# Patient Record
Sex: FEMALE | Race: OTHER | Employment: FULL TIME | URBAN - METROPOLITAN AREA
[De-identification: names, ages, dates, MRNs, and addresses within clinical notes are randomized per-mention and may not be internally consistent; named-entity substitution may affect disease eponyms.]

---

## 2017-03-08 ENCOUNTER — HOSPITAL ENCOUNTER (OUTPATIENT)
Age: 37
Discharge: HOME | End: 2017-03-08
Attending: EMERGENCY MEDICINE
Payer: COMMERCIAL

## 2017-03-08 ENCOUNTER — APPOINTMENT (OUTPATIENT)
Dept: GENERAL RADIOLOGY | Age: 37
End: 2017-03-08
Attending: EMERGENCY MEDICINE
Payer: COMMERCIAL

## 2017-03-08 VITALS
DIASTOLIC BLOOD PRESSURE: 83 MMHG | TEMPERATURE: 98 F | HEART RATE: 81 BPM | OXYGEN SATURATION: 99 % | RESPIRATION RATE: 18 BRPM | SYSTOLIC BLOOD PRESSURE: 137 MMHG

## 2017-03-08 DIAGNOSIS — M54.6 ACUTE LEFT-SIDED THORACIC BACK PAIN: Primary | ICD-10-CM

## 2017-03-08 LAB — B-HCG UR QL: NEGATIVE

## 2017-03-08 PROCEDURE — 99214 OFFICE O/P EST MOD 30 MIN: CPT

## 2017-03-08 PROCEDURE — 71020 XR CHEST PA + LAT CHEST (CPT=71020): CPT

## 2017-03-08 PROCEDURE — 93010 ELECTROCARDIOGRAM REPORT: CPT

## 2017-03-08 PROCEDURE — 81025 URINE PREGNANCY TEST: CPT

## 2017-03-08 PROCEDURE — 93010 ELECTROCARDIOGRAM REPORT: CPT | Performed by: EMERGENCY MEDICINE

## 2017-03-08 PROCEDURE — 93005 ELECTROCARDIOGRAM TRACING: CPT

## 2017-03-08 RX ORDER — CYCLOBENZAPRINE HCL 10 MG
10 TABLET ORAL NIGHTLY
Qty: 7 TABLET | Refills: 0 | Status: SHIPPED | OUTPATIENT
Start: 2017-03-08 | End: 2017-03-15

## 2017-03-08 NOTE — ED NOTES
Request pregnancy test. States her breast hurt and even though period less than one month ago- she did a preg test last week and negative- maybe this week she is positive.

## 2017-03-08 NOTE — ED INITIAL ASSESSMENT (HPI)
Sudden onset of left lateral chest discomfort which started yesterday no trauma occasional shortness of breath non smoker - unable to get comfortable not tender to palpations. Not reproduce able. No URI complaints.

## 2017-03-08 NOTE — ED NOTES
Avoid heavy lifting carring motrin for pain flexeril at night. Follow up with pcp in 3 days if not better.  Go to the er for new or worse concerns

## 2017-03-08 NOTE — ED PROVIDER NOTES
Patient Seen in: [facility] In [address] [place] [address]    History   Patient presents with:  Back Pain    Stated Complaint: back pain     HPI    The patient is a 21-year-old female who presents with sharp pain under her left scapula for 2 days.   Pa Cardiovascular: Normal rate, regular rhythm, normal heart sounds and intact distal pulses. No murmur heard. Pulmonary/Chest: Effort normal and breath sounds normal.   Abdominal: Soft. Bowel sounds are normal. There is no tenderness.    Musculoskeletal:

## 2017-10-26 ENCOUNTER — HOSPITAL ENCOUNTER (EMERGENCY)
Facility: HOSPITAL | Age: 37
Discharge: HOME | End: 2017-10-26
Payer: COMMERCIAL

## 2017-10-26 VITALS
HEIGHT: 61 IN | OXYGEN SATURATION: 99 % | BODY MASS INDEX: 33.04 KG/M2 | TEMPERATURE: 98 F | SYSTOLIC BLOOD PRESSURE: 106 MMHG | RESPIRATION RATE: 16 BRPM | WEIGHT: 175 LBS | HEART RATE: 102 BPM | DIASTOLIC BLOOD PRESSURE: 62 MMHG

## 2017-10-26 DIAGNOSIS — K29.00 ACUTE GASTRITIS WITHOUT HEMORRHAGE, UNSPECIFIED GASTRITIS TYPE: Primary | ICD-10-CM

## 2017-10-26 PROCEDURE — 85025 COMPLETE CBC W/AUTO DIFF WBC: CPT | Performed by: NURSE PRACTITIONER

## 2017-10-26 PROCEDURE — 87086 URINE CULTURE/COLONY COUNT: CPT

## 2017-10-26 PROCEDURE — 80076 HEPATIC FUNCTION PANEL: CPT | Performed by: NURSE PRACTITIONER

## 2017-10-26 PROCEDURE — 96361 HYDRATE IV INFUSION ADD-ON: CPT

## 2017-10-26 PROCEDURE — 81025 URINE PREGNANCY TEST: CPT

## 2017-10-26 PROCEDURE — 80048 BASIC METABOLIC PNL TOTAL CA: CPT | Performed by: NURSE PRACTITIONER

## 2017-10-26 PROCEDURE — 81001 URINALYSIS AUTO W/SCOPE: CPT

## 2017-10-26 PROCEDURE — 96374 THER/PROPH/DIAG INJ IV PUSH: CPT

## 2017-10-26 PROCEDURE — 83690 ASSAY OF LIPASE: CPT | Performed by: NURSE PRACTITIONER

## 2017-10-26 PROCEDURE — 99284 EMERGENCY DEPT VISIT MOD MDM: CPT

## 2017-10-26 RX ORDER — DICYCLOMINE HCL 20 MG
20 TABLET ORAL 4 TIMES DAILY PRN
Qty: 30 TABLET | Refills: 0 | Status: SHIPPED | OUTPATIENT
Start: 2017-10-26 | End: 2017-11-25

## 2017-10-26 RX ORDER — ONDANSETRON 2 MG/ML
4 INJECTION INTRAMUSCULAR; INTRAVENOUS ONCE
Status: COMPLETED | OUTPATIENT
Start: 2017-10-26 | End: 2017-10-26

## 2017-10-26 NOTE — ED PROVIDER NOTES
Patient Seen in: [facility] Emergency Department    History   Patient presents with:  Abdomen/Flank Pain (GI/)    Stated Complaint: umbilical pain    HPI  [id number] female, with no past medical history, presents to the emergency department com distension. There is tenderness (uppper mid abdomen diffusely). There is no rebound and no guarding. Musculoskeletal: She exhibits no tenderness. Neurological: She is alert and oriented to person, place, and time. Skin: No rash noted.    Nursing note has resolved/normal labs discussed with the pt  Examination by Dr. Donn Go    Disposition and Plan     Clinical Impression:  Acute gastritis without hemorrhage, unspecified gastritis type  (primary encounter diagnosis)    Disposition:  There is no dispos

## 2022-08-29 ENCOUNTER — OFFICE VISIT (OUTPATIENT)
Dept: FAMILY MEDICINE CLINIC | Facility: CLINIC | Age: 42
End: 2022-08-29
Payer: COMMERCIAL

## 2022-08-29 VITALS
SYSTOLIC BLOOD PRESSURE: 132 MMHG | WEIGHT: 221 LBS | HEART RATE: 78 BPM | HEIGHT: 61 IN | DIASTOLIC BLOOD PRESSURE: 87 MMHG | BODY MASS INDEX: 41.72 KG/M2

## 2022-08-29 DIAGNOSIS — Z13.29 THYROID DISORDER SCREEN: ICD-10-CM

## 2022-08-29 DIAGNOSIS — Z00.00 WELLNESS EXAMINATION: Primary | ICD-10-CM

## 2022-08-29 DIAGNOSIS — L85.3 DRY SKIN: ICD-10-CM

## 2022-08-29 DIAGNOSIS — L85.8 KERATOSIS PILARIS: ICD-10-CM

## 2022-08-29 DIAGNOSIS — Z86.59 HISTORY OF ANXIETY: ICD-10-CM

## 2022-08-29 DIAGNOSIS — Z13.220 LIPID SCREENING: ICD-10-CM

## 2022-08-29 DIAGNOSIS — Z12.31 ENCOUNTER FOR SCREENING MAMMOGRAM FOR MALIGNANT NEOPLASM OF BREAST: ICD-10-CM

## 2022-08-29 DIAGNOSIS — R63.5 WEIGHT GAIN: ICD-10-CM

## 2022-08-29 DIAGNOSIS — L30.9 ECZEMA, UNSPECIFIED TYPE: ICD-10-CM

## 2022-08-29 DIAGNOSIS — Z12.4 CERVICAL CANCER SCREENING: ICD-10-CM

## 2022-08-29 DIAGNOSIS — Z13.0 SCREENING FOR DEFICIENCY ANEMIA: ICD-10-CM

## 2022-08-29 RX ORDER — AMMONIUM LACTATE 12 G/100G
1 CREAM TOPICAL 2 TIMES DAILY
Qty: 385 G | Refills: 1 | Status: SHIPPED | OUTPATIENT
Start: 2022-08-29 | End: 2023-08-24

## 2022-08-29 RX ORDER — BETAMETHASONE DIPROPIONATE 0.5 MG/G
1 CREAM TOPICAL 2 TIMES DAILY
Qty: 15 G | Refills: 0 | Status: SHIPPED | OUTPATIENT
Start: 2022-08-29

## 2022-09-07 ENCOUNTER — LAB ENCOUNTER (OUTPATIENT)
Dept: LAB | Age: 42
End: 2022-09-07
Attending: FAMILY MEDICINE
Payer: COMMERCIAL

## 2022-09-07 DIAGNOSIS — Z13.29 THYROID DISORDER SCREEN: ICD-10-CM

## 2022-09-07 DIAGNOSIS — Z00.00 WELLNESS EXAMINATION: ICD-10-CM

## 2022-09-07 DIAGNOSIS — R79.89 ABNORMAL THYROID BLOOD TEST: Primary | ICD-10-CM

## 2022-09-07 DIAGNOSIS — Z13.220 LIPID SCREENING: ICD-10-CM

## 2022-09-07 DIAGNOSIS — Z13.0 SCREENING FOR DEFICIENCY ANEMIA: ICD-10-CM

## 2022-09-07 LAB
ALBUMIN SERPL-MCNC: 3.4 G/DL (ref 3.4–5)
ALBUMIN/GLOB SERPL: 1 {RATIO} (ref 1–2)
ALP LIVER SERPL-CCNC: 66 U/L
ALT SERPL-CCNC: 32 U/L
ANION GAP SERPL CALC-SCNC: 9 MMOL/L (ref 0–18)
AST SERPL-CCNC: 20 U/L (ref 15–37)
BASOPHILS # BLD AUTO: 0.03 X10(3) UL (ref 0–0.2)
BASOPHILS NFR BLD AUTO: 0.5 %
BILIRUB SERPL-MCNC: 0.7 MG/DL (ref 0.1–2)
BUN BLD-MCNC: 9 MG/DL (ref 7–18)
BUN/CREAT SERPL: 12.3 (ref 10–20)
CALCIUM BLD-MCNC: 8.5 MG/DL (ref 8.5–10.1)
CHLORIDE SERPL-SCNC: 110 MMOL/L (ref 98–112)
CHOLEST SERPL-MCNC: 186 MG/DL (ref ?–200)
CO2 SERPL-SCNC: 22 MMOL/L (ref 21–32)
CREAT BLD-MCNC: 0.73 MG/DL
DEPRECATED RDW RBC AUTO: 45.2 FL (ref 35.1–46.3)
EOSINOPHIL # BLD AUTO: 0.08 X10(3) UL (ref 0–0.7)
EOSINOPHIL NFR BLD AUTO: 1.3 %
ERYTHROCYTE [DISTWIDTH] IN BLOOD BY AUTOMATED COUNT: 12.4 % (ref 11–15)
FASTING PATIENT LIPID ANSWER: YES
FASTING STATUS PATIENT QL REPORTED: YES
GFR SERPLBLD BASED ON 1.73 SQ M-ARVRAT: 106 ML/MIN/1.73M2 (ref 60–?)
GLOBULIN PLAS-MCNC: 3.3 G/DL (ref 2.8–4.4)
GLUCOSE BLD-MCNC: 95 MG/DL (ref 70–99)
HCT VFR BLD AUTO: 42.4 %
HDLC SERPL-MCNC: 45 MG/DL (ref 40–59)
HGB BLD-MCNC: 13.5 G/DL
IMM GRANULOCYTES # BLD AUTO: 0.03 X10(3) UL (ref 0–1)
IMM GRANULOCYTES NFR BLD: 0.5 %
LDLC SERPL CALC-MCNC: 97 MG/DL (ref ?–100)
LYMPHOCYTES # BLD AUTO: 1.52 X10(3) UL (ref 1–4)
LYMPHOCYTES NFR BLD AUTO: 25.5 %
MCH RBC QN AUTO: 30.8 PG (ref 26–34)
MCHC RBC AUTO-ENTMCNC: 31.8 G/DL (ref 31–37)
MCV RBC AUTO: 96.8 FL
MONOCYTES # BLD AUTO: 0.55 X10(3) UL (ref 0.1–1)
MONOCYTES NFR BLD AUTO: 9.2 %
NEUTROPHILS # BLD AUTO: 3.75 X10 (3) UL (ref 1.5–7.7)
NEUTROPHILS # BLD AUTO: 3.75 X10(3) UL (ref 1.5–7.7)
NEUTROPHILS NFR BLD AUTO: 63 %
NONHDLC SERPL-MCNC: 141 MG/DL (ref ?–130)
OSMOLALITY SERPL CALC.SUM OF ELEC: 290 MOSM/KG (ref 275–295)
PLATELET # BLD AUTO: 277 10(3)UL (ref 150–450)
POTASSIUM SERPL-SCNC: 3.8 MMOL/L (ref 3.5–5.1)
PROT SERPL-MCNC: 6.7 G/DL (ref 6.4–8.2)
RBC # BLD AUTO: 4.38 X10(6)UL
SODIUM SERPL-SCNC: 141 MMOL/L (ref 136–145)
T4 FREE SERPL-MCNC: 0.8 NG/DL (ref 0.8–1.7)
TRIGL SERPL-MCNC: 263 MG/DL (ref 30–149)
TSI SER-ACNC: 4.23 MIU/ML (ref 0.36–3.74)
VLDLC SERPL CALC-MCNC: 44 MG/DL (ref 0–30)
WBC # BLD AUTO: 6 X10(3) UL (ref 4–11)

## 2022-09-07 PROCEDURE — 80061 LIPID PANEL: CPT

## 2022-09-07 PROCEDURE — 84443 ASSAY THYROID STIM HORMONE: CPT

## 2022-09-07 PROCEDURE — 85025 COMPLETE CBC W/AUTO DIFF WBC: CPT

## 2022-09-07 PROCEDURE — 36415 COLL VENOUS BLD VENIPUNCTURE: CPT

## 2022-09-07 PROCEDURE — 80053 COMPREHEN METABOLIC PANEL: CPT

## 2022-09-07 PROCEDURE — 84439 ASSAY OF FREE THYROXINE: CPT

## 2022-09-14 LAB — HPV I/H RISK 1 DNA SPEC QL NAA+PROBE: NEGATIVE

## 2022-09-22 ENCOUNTER — HOSPITAL ENCOUNTER (OUTPATIENT)
Age: 42
Discharge: HOME | End: 2022-09-22

## 2022-09-22 ENCOUNTER — APPOINTMENT (OUTPATIENT)
Dept: GENERAL RADIOLOGY | Age: 42
End: 2022-09-22
Attending: NURSE PRACTITIONER

## 2022-09-22 VITALS
OXYGEN SATURATION: 100 % | BODY MASS INDEX: 41.54 KG/M2 | HEART RATE: 110 BPM | HEIGHT: 61 IN | RESPIRATION RATE: 18 BRPM | SYSTOLIC BLOOD PRESSURE: 111 MMHG | DIASTOLIC BLOOD PRESSURE: 69 MMHG | WEIGHT: 220 LBS | TEMPERATURE: 97 F

## 2022-09-22 DIAGNOSIS — R09.89 FEELING OF FOREIGN BODY IN THROAT: Primary | ICD-10-CM

## 2022-09-22 PROCEDURE — 70360 X-RAY EXAM OF NECK: CPT | Performed by: NURSE PRACTITIONER

## 2022-09-22 RX ORDER — LIDOCAINE HYDROCHLORIDE 20 MG/ML
10 SOLUTION OROPHARYNGEAL ONCE
Status: COMPLETED | OUTPATIENT
Start: 2022-09-22 | End: 2022-09-22

## 2022-09-22 NOTE — ED INITIAL ASSESSMENT (HPI)
Pt presents to the IC with c/o throat irritation- was eating bread this morning and feels like bread \"is stuck in throat\". Pt able to eat and drink afterwards.

## 2022-09-28 ENCOUNTER — OFFICE VISIT (OUTPATIENT)
Dept: GASTROENTEROLOGY | Facility: CLINIC | Age: 42
End: 2022-09-28

## 2022-09-28 ENCOUNTER — TELEPHONE (OUTPATIENT)
Dept: GASTROENTEROLOGY | Facility: CLINIC | Age: 42
End: 2022-09-28

## 2022-09-28 VITALS
DIASTOLIC BLOOD PRESSURE: 83 MMHG | HEART RATE: 89 BPM | BODY MASS INDEX: 41.91 KG/M2 | SYSTOLIC BLOOD PRESSURE: 123 MMHG | WEIGHT: 222 LBS | HEIGHT: 61 IN

## 2022-09-28 DIAGNOSIS — R12 CHRONIC HEARTBURN: ICD-10-CM

## 2022-09-28 DIAGNOSIS — R09.89 GLOBUS SENSATION: Primary | ICD-10-CM

## 2022-09-28 DIAGNOSIS — R09.89 GLOBUS SENSATION: ICD-10-CM

## 2022-09-28 DIAGNOSIS — R13.10 DYSPHAGIA, UNSPECIFIED TYPE: Primary | ICD-10-CM

## 2022-09-28 DIAGNOSIS — K21.9 GASTROESOPHAGEAL REFLUX DISEASE, UNSPECIFIED WHETHER ESOPHAGITIS PRESENT: ICD-10-CM

## 2022-09-28 PROCEDURE — 99203 OFFICE O/P NEW LOW 30 MIN: CPT | Performed by: INTERNAL MEDICINE

## 2022-09-28 PROCEDURE — 3079F DIAST BP 80-89 MM HG: CPT | Performed by: INTERNAL MEDICINE

## 2022-09-28 PROCEDURE — 3074F SYST BP LT 130 MM HG: CPT | Performed by: INTERNAL MEDICINE

## 2022-09-28 PROCEDURE — 3008F BODY MASS INDEX DOCD: CPT | Performed by: INTERNAL MEDICINE

## 2022-09-28 NOTE — PATIENT INSTRUCTIONS
1.  Eat softer foods until the symptoms improve.   2.  Take Prilosec once daily in the morning 15-30 minutes before breakfast.  3.  Schedule upper endoscopy with monitored anesthesia care for dysphagia, chronic heartburn and foreign body sensation (globus)

## 2022-09-28 NOTE — TELEPHONE ENCOUNTER
Patient saw Dr. Rocio Rea in office today. Needed to be seen next week. He ok'd to schedule with provider who had an opening. Scheduled for:  [phone]  Provider Name:  Dr. Alice Chaparro  Date:  10/3/2022  [facility]  [facility]  Sedation:  MAC  Time:  10:15 am, (pt is aware that [facility]placed [facility] will call the day before to confirm arrival time)  Prep:  NPO after midnight  Meds/Allergies Reconciled?:  Yes  Diagnosis with codes:  Dysphagia R13.10; Chronic heartburn R12; Globus sensation R09.89  Was patient informed to call insurance with codes (Y/N):  Yes  Referral sent?:  Yes  [facility] or [id number] notified?: Electronic case request was sent to [facility] via [employer]. Medication Orders:  N/A  Misc Orders:  Patient was informed that they will need a COVID 19 test prior to their procedure. Patient verbally understood & will await a phone call from [facility] to schedule. Further instructions given by staff:  I discussed the prep instructions with the patient which she verbally understood and provided patient with prep instruction sheet.

## 2022-09-30 ENCOUNTER — LAB REQUISITION (OUTPATIENT)
Dept: SURGERY | Age: 42
End: 2022-09-30
Payer: COMMERCIAL

## 2022-09-30 DIAGNOSIS — Z01.818 PREOP EXAMINATION: ICD-10-CM

## 2022-10-01 LAB — SARS-COV-2 RNA RESP QL NAA+PROBE: NOT DETECTED

## 2022-10-03 ENCOUNTER — SURGERY CENTER DOCUMENTATION (OUTPATIENT)
Dept: SURGERY | Age: 42
End: 2022-10-03

## 2022-10-03 ENCOUNTER — LAB REQUISITION (OUTPATIENT)
Dept: SURGERY | Age: 42
End: 2022-10-03
Payer: COMMERCIAL

## 2022-10-03 DIAGNOSIS — R09.89 ABNORMAL CHEST SOUNDS: ICD-10-CM

## 2022-10-03 DIAGNOSIS — R19.7 DIARRHEA: ICD-10-CM

## 2022-10-03 DIAGNOSIS — R12 HEARTBURN: ICD-10-CM

## 2022-10-03 PROCEDURE — 88305 TISSUE EXAM BY PATHOLOGIST: CPT | Performed by: INTERNAL MEDICINE

## 2022-10-03 PROCEDURE — 88312 SPECIAL STAINS GROUP 1: CPT | Performed by: INTERNAL MEDICINE

## 2022-10-03 NOTE — PROCEDURES
ESOPHAGOGASTRODUODENOSCOPY (EGD) [address]     1980 Age 40year old   PCP Neris Hurley MD Endoscopist Ciera Bojorquez MD     Date of procedure: 10/03/22    Location: [facility]     Procedure: EGD with cold biopsy    Pre-operative diagnosis: globus, GERD, diarrhea    Post-operative diagnosis: LA Class A esophagitis, mild gastritis, gastric polyps, 1 cm hiatal hernia    Medications: MAC    Complications: none    Procedure:  Informed consent was obtained from the patient after the risks of the procedure were discussed, including but not limited to bleeding, perforation, aspiration, infection, or possibility of a missed lesion. After discussions of the risks/benefits and alternatives to this procedure, as well as the planned sedation, the patient was placed in the left lateral decubitus position and begun on continuous blood pressure pulse oximetry and EKG monitoring and this was maintained throughout the procedure. Once an adequate level of sedation was obtained a bite block was placed. Then the lubricated tip of the [id number] diagnostic video upper endoscope was inserted and advanced using direct visualization into the posterior pharynx and ultimately into the esophagus. Complications: None    Findings:      1. Esophagus: The squamocolumnar (SCJ) junction was noted at 35 cm and appeared mildly irregular due to LA Class A distal esophagitis that was sampled with cold forceps biopsies. There was a non-obstructing Schatzki's ring at the SCJ that was disrupted in four quadrants with the cold forceps biopsies. The diaphragmatic pinch was noted noted at 36 cm from the incisors. There was a 1 cm hiatal hernai. The esophageal mucosa appeared otherwise unremarkable. Proximal esophageal biopsies were taken and placed in a separate jar. 2. Stomach: The stomach distended normally. Normal rugal folds were seen. The pylorus was patent.  The gastric mucosa appeared mildly erythematous diffusely. Retroflexion revealed a normal fundus and a non-patulous cardia. There were several small gastric polyps measuring less than 5 mm that were sampled with cold forceps biopsies. Random cold forceps biopsies were taken of the antrum, incisura, and body. 3. Duodenum: The duodenal mucosa appeared normal in the 1st and 2nd portion of the duodenum. Cold forceps biopsies were taken of the bulb and descending duodenum. Impression:  1. LA Class A distal esophagitis. Biopsied. 2. Non-obstructing Schatzki's ring at the SCJ. Disrupted in four quadrants with cold forceps biopsies. 3. Otherwise unremarkable esophagus. Random proximal esophageal biopsies were taken. 4. Mild gastritis. Biopsied. 5. Small gastric polyps. Likely fundic gland polyps. Biopsied. 6. Normal duodenum. Biopsied. Recommend:  1. Await pathology. 2. Follow up with Dr. Reza Malone in 2 weeks. 3. Take omeprazole (Prilosec) 40 mg daily.      >>>If tissue was sampled/removed and you have not received your pathology results either by phone or letter within 2 weeks, please call our office at [phone].     Specimens: esophagus, stomach, duodenum    Blood loss: <1 ml

## 2022-10-17 ENCOUNTER — OFFICE VISIT (OUTPATIENT)
Dept: FAMILY MEDICINE CLINIC | Facility: CLINIC | Age: 42
End: 2022-10-17
Payer: COMMERCIAL

## 2022-10-17 VITALS
DIASTOLIC BLOOD PRESSURE: 82 MMHG | SYSTOLIC BLOOD PRESSURE: 121 MMHG | HEART RATE: 84 BPM | WEIGHT: 218 LBS | BODY MASS INDEX: 41.16 KG/M2 | HEIGHT: 61 IN

## 2022-10-17 DIAGNOSIS — R10.2 PELVIC PRESSURE IN FEMALE: ICD-10-CM

## 2022-10-17 DIAGNOSIS — Z23 NEED FOR TDAP VACCINATION: ICD-10-CM

## 2022-10-17 DIAGNOSIS — H81.13 BENIGN PAROXYSMAL POSITIONAL VERTIGO DUE TO BILATERAL VESTIBULAR DISORDER: Primary | ICD-10-CM

## 2022-10-17 PROCEDURE — 3079F DIAST BP 80-89 MM HG: CPT | Performed by: FAMILY MEDICINE

## 2022-10-17 PROCEDURE — 99213 OFFICE O/P EST LOW 20 MIN: CPT | Performed by: FAMILY MEDICINE

## 2022-10-17 PROCEDURE — 3074F SYST BP LT 130 MM HG: CPT | Performed by: FAMILY MEDICINE

## 2022-10-17 PROCEDURE — 90471 IMMUNIZATION ADMIN: CPT | Performed by: FAMILY MEDICINE

## 2022-10-17 PROCEDURE — 3008F BODY MASS INDEX DOCD: CPT | Performed by: FAMILY MEDICINE

## 2022-10-17 PROCEDURE — 90715 TDAP VACCINE 7 YRS/> IM: CPT | Performed by: FAMILY MEDICINE

## 2023-02-18 ENCOUNTER — LAB ENCOUNTER (OUTPATIENT)
Dept: LAB | Age: 43
End: 2023-02-18
Attending: FAMILY MEDICINE
Payer: COMMERCIAL

## 2023-02-18 DIAGNOSIS — R79.89 ABNORMAL THYROID BLOOD TEST: ICD-10-CM

## 2023-02-18 LAB
THYROPEROXIDASE AB SERPL-ACNC: 3384 U/ML (ref ?–60)
TSI SER-ACNC: 1.83 MIU/ML (ref 0.36–3.74)

## 2023-02-18 PROCEDURE — 86376 MICROSOMAL ANTIBODY EACH: CPT

## 2023-02-18 PROCEDURE — 84443 ASSAY THYROID STIM HORMONE: CPT

## 2023-02-18 PROCEDURE — 36415 COLL VENOUS BLD VENIPUNCTURE: CPT

## 2023-02-20 PROBLEM — R79.89 ABNORMAL THYROID BLOOD TEST: Status: ACTIVE | Noted: 2023-02-20

## 2023-03-27 ENCOUNTER — OFFICE VISIT (OUTPATIENT)
Dept: FAMILY MEDICINE CLINIC | Facility: CLINIC | Age: 43
End: 2023-03-27

## 2023-03-27 VITALS
DIASTOLIC BLOOD PRESSURE: 84 MMHG | SYSTOLIC BLOOD PRESSURE: 136 MMHG | BODY MASS INDEX: 41.91 KG/M2 | HEART RATE: 89 BPM | HEIGHT: 61 IN | WEIGHT: 222 LBS

## 2023-03-27 DIAGNOSIS — R42 EPISODIC LIGHTHEADEDNESS: Primary | ICD-10-CM

## 2023-03-27 DIAGNOSIS — G44.219 EPISODIC TENSION-TYPE HEADACHE, NOT INTRACTABLE: ICD-10-CM

## 2023-03-27 PROCEDURE — 3079F DIAST BP 80-89 MM HG: CPT | Performed by: FAMILY MEDICINE

## 2023-03-27 PROCEDURE — 3075F SYST BP GE 130 - 139MM HG: CPT | Performed by: FAMILY MEDICINE

## 2023-03-27 PROCEDURE — 99214 OFFICE O/P EST MOD 30 MIN: CPT | Performed by: FAMILY MEDICINE

## 2023-03-27 PROCEDURE — 3008F BODY MASS INDEX DOCD: CPT | Performed by: FAMILY MEDICINE

## 2023-03-27 RX ORDER — NORTRIPTYLINE HYDROCHLORIDE 10 MG/1
10 CAPSULE ORAL NIGHTLY
Qty: 30 CAPSULE | Refills: 1 | Status: SHIPPED | OUTPATIENT
Start: 2023-03-27

## 2023-04-11 ENCOUNTER — LAB ENCOUNTER (OUTPATIENT)
Dept: LAB | Age: 43
End: 2023-04-11
Attending: FAMILY MEDICINE
Payer: COMMERCIAL

## 2023-04-11 ENCOUNTER — EKG ENCOUNTER (OUTPATIENT)
Dept: LAB | Age: 43
End: 2023-04-11
Attending: FAMILY MEDICINE
Payer: COMMERCIAL

## 2023-04-11 DIAGNOSIS — R42 EPISODIC LIGHTHEADEDNESS: Primary | ICD-10-CM

## 2023-04-11 DIAGNOSIS — R42 EPISODIC LIGHTHEADEDNESS: ICD-10-CM

## 2023-04-11 DIAGNOSIS — R94.31 ABNORMAL EKG: ICD-10-CM

## 2023-04-11 LAB
ATRIAL RATE: 80 BPM
BASOPHILS # BLD AUTO: 0.02 X10(3) UL (ref 0–0.2)
BASOPHILS NFR BLD AUTO: 0.3 %
CHOLEST SERPL-MCNC: 199 MG/DL (ref ?–200)
DEPRECATED RDW RBC AUTO: 46 FL (ref 35.1–46.3)
EOSINOPHIL # BLD AUTO: 0.09 X10(3) UL (ref 0–0.7)
EOSINOPHIL NFR BLD AUTO: 1.4 %
ERYTHROCYTE [DISTWIDTH] IN BLOOD BY AUTOMATED COUNT: 12.9 % (ref 11–15)
EST. AVERAGE GLUCOSE BLD GHB EST-MCNC: 108 MG/DL (ref 68–126)
FASTING PATIENT LIPID ANSWER: YES
HBA1C MFR BLD: 5.4 % (ref ?–5.7)
HCT VFR BLD AUTO: 41.4 %
HDLC SERPL-MCNC: 53 MG/DL (ref 40–59)
HGB BLD-MCNC: 13.4 G/DL
IMM GRANULOCYTES # BLD AUTO: 0.02 X10(3) UL (ref 0–1)
IMM GRANULOCYTES NFR BLD: 0.3 %
LDLC SERPL CALC-MCNC: 115 MG/DL (ref ?–100)
LYMPHOCYTES # BLD AUTO: 1.72 X10(3) UL (ref 1–4)
LYMPHOCYTES NFR BLD AUTO: 26.9 %
MCH RBC QN AUTO: 30.9 PG (ref 26–34)
MCHC RBC AUTO-ENTMCNC: 32.4 G/DL (ref 31–37)
MCV RBC AUTO: 95.6 FL
MONOCYTES # BLD AUTO: 0.49 X10(3) UL (ref 0.1–1)
MONOCYTES NFR BLD AUTO: 7.7 %
NEUTROPHILS # BLD AUTO: 4.06 X10 (3) UL (ref 1.5–7.7)
NEUTROPHILS # BLD AUTO: 4.06 X10(3) UL (ref 1.5–7.7)
NEUTROPHILS NFR BLD AUTO: 63.4 %
NONHDLC SERPL-MCNC: 146 MG/DL (ref ?–130)
P AXIS: 36 DEGREES
P-R INTERVAL: 138 MS
PLATELET # BLD AUTO: 300 10(3)UL (ref 150–450)
Q-T INTERVAL: 364 MS
QRS DURATION: 66 MS
QTC CALCULATION (BEZET): 419 MS
R AXIS: 19 DEGREES
RBC # BLD AUTO: 4.33 X10(6)UL
T AXIS: 15 DEGREES
TRIGL SERPL-MCNC: 175 MG/DL (ref 30–149)
VENTRICULAR RATE: 80 BPM
VLDLC SERPL CALC-MCNC: 30 MG/DL (ref 0–30)
WBC # BLD AUTO: 6.4 X10(3) UL (ref 4–11)

## 2023-04-11 PROCEDURE — 93005 ELECTROCARDIOGRAM TRACING: CPT

## 2023-04-11 PROCEDURE — 85025 COMPLETE CBC W/AUTO DIFF WBC: CPT

## 2023-04-11 PROCEDURE — 83036 HEMOGLOBIN GLYCOSYLATED A1C: CPT

## 2023-04-11 PROCEDURE — 93010 ELECTROCARDIOGRAM REPORT: CPT | Performed by: INTERNAL MEDICINE

## 2023-04-11 PROCEDURE — 36415 COLL VENOUS BLD VENIPUNCTURE: CPT

## 2023-04-11 PROCEDURE — 80061 LIPID PANEL: CPT

## 2023-04-19 DIAGNOSIS — G44.219 EPISODIC TENSION-TYPE HEADACHE, NOT INTRACTABLE: ICD-10-CM

## 2023-04-20 RX ORDER — NORTRIPTYLINE HYDROCHLORIDE 10 MG/1
10 CAPSULE ORAL NIGHTLY
Qty: 30 CAPSULE | Refills: 1 | Status: SHIPPED | OUTPATIENT
Start: 2023-04-20

## 2023-05-09 DIAGNOSIS — G44.219 EPISODIC TENSION-TYPE HEADACHE, NOT INTRACTABLE: ICD-10-CM

## 2023-05-09 NOTE — TELEPHONE ENCOUNTER
nortriptyline 10 MG Oral Cap, Take 1 capsule (10 mg total) by mouth nightly., Disp: 30 capsule, Rfl: 1

## 2023-05-10 ENCOUNTER — PATIENT MESSAGE (OUTPATIENT)
Dept: FAMILY MEDICINE CLINIC | Facility: CLINIC | Age: 43
End: 2023-05-10

## 2023-05-10 RX ORDER — NORTRIPTYLINE HYDROCHLORIDE 10 MG/1
10 CAPSULE ORAL NIGHTLY
Qty: 30 CAPSULE | Refills: 1 | Status: SHIPPED | OUTPATIENT
Start: 2023-05-10

## 2023-05-10 NOTE — TELEPHONE ENCOUNTER
Please review; protocol failed. Or has no protocol    Requested Prescriptions   Pending Prescriptions Disp Refills    nortriptyline 10 MG Oral Cap 30 capsule 1     Sig: Take 1 capsule (10 mg total) by mouth nightly.        There is no refill protocol information for this order           Recent Outpatient Visits              1 month ago Episodic lightheadedness    [facility], [address], [place] Ar[place] Economy, [place]    Office Visit    6 months ago Benign paroxysmal positional vertigo due to bilateral vestibular disorder    [facility], [address], [place] Ar[place] Economy, MD    Office Visit    7 months ago Globus sensation    Jessica Aragon MD    Office Visit    8 months ago Wellness examination    [facility], [address], [place] Economy, [place]    Office Visit

## 2023-05-10 NOTE — TELEPHONE ENCOUNTER
From: Reshma Saab  To: Yazmin Magana. Teo Shea MD  Sent: 5/10/2023 12:54 PM CDT  Subject: Regarding medication refill    I have not been in need to take the last medication prescribed to me. No need to resend refills, please.

## 2023-08-03 ENCOUNTER — HOSPITAL ENCOUNTER (OUTPATIENT)
Dept: MAMMOGRAPHY | Facility: HOSPITAL | Age: 43
Discharge: HOME | End: 2023-08-03
Attending: FAMILY MEDICINE
Payer: COMMERCIAL

## 2023-08-03 DIAGNOSIS — Z12.31 ENCOUNTER FOR SCREENING MAMMOGRAM FOR MALIGNANT NEOPLASM OF BREAST: ICD-10-CM

## 2023-08-03 PROCEDURE — 77063 BREAST TOMOSYNTHESIS BI: CPT | Performed by: FAMILY MEDICINE

## 2023-08-03 PROCEDURE — 77067 SCR MAMMO BI INCL CAD: CPT | Performed by: FAMILY MEDICINE

## 2023-11-20 ENCOUNTER — HOSPITAL ENCOUNTER (EMERGENCY)
Facility: HOSPITAL | Age: 43
Discharge: HOME | End: 2023-11-20
Attending: EMERGENCY MEDICINE
Payer: COMMERCIAL

## 2023-11-20 ENCOUNTER — APPOINTMENT (OUTPATIENT)
Dept: GENERAL RADIOLOGY | Facility: HOSPITAL | Age: 43
End: 2023-11-20
Payer: COMMERCIAL

## 2023-11-20 VITALS
OXYGEN SATURATION: 97 % | TEMPERATURE: 98 F | HEART RATE: 90 BPM | RESPIRATION RATE: 18 BRPM | SYSTOLIC BLOOD PRESSURE: 128 MMHG | DIASTOLIC BLOOD PRESSURE: 70 MMHG

## 2023-11-20 DIAGNOSIS — S62.609A CLOSED NONDISPLACED FRACTURE OF PHALANX OF FINGER, UNSPECIFIED FINGER, UNSPECIFIED PHALANX, INITIAL ENCOUNTER: Primary | ICD-10-CM

## 2023-11-20 PROCEDURE — 73140 X-RAY EXAM OF FINGER(S): CPT

## 2023-11-20 PROCEDURE — 26750 TREAT FINGER FRACTURE EACH: CPT

## 2023-11-20 PROCEDURE — 99283 EMERGENCY DEPT VISIT LOW MDM: CPT

## 2023-11-21 NOTE — DISCHARGE INSTRUCTIONS
Return to ER for changes in color of skin, decreased pulses in that extremity, hard compartments. If you have an open wound associated with your fracture, signs of infection are redness associated with warmth/pain, puss from site, fevers of 100.4F or above. Please elevate your your fracture above your heart when at rest to reduce swelling. Please follow with the hand specialist immediately. There may be an element of a tendon laceration that you will need mending      The Emergency Department is not intended to be a substitute for an effort to provide complete medical care. The imaging, if any, have often been interpreted on a preliminary basis pending final reading by the radiologist. If your blood pressure was greater than 140/90, please have this blood pressure rechecked by your primary care provider in the next several days.

## 2023-11-21 NOTE — ED INITIAL ASSESSMENT (HPI)
Pt to ED for right ring finger injury after stubbing it on a ball.  Finger is deformed at first knuckle, bruised, ice applied

## 2023-12-07 ENCOUNTER — HOSPITAL ENCOUNTER (OUTPATIENT)
Age: 43
Discharge: HOME | End: 2023-12-07
Payer: COMMERCIAL

## 2023-12-07 VITALS
SYSTOLIC BLOOD PRESSURE: 125 MMHG | OXYGEN SATURATION: 99 % | RESPIRATION RATE: 20 BRPM | HEIGHT: 60 IN | WEIGHT: 190 LBS | TEMPERATURE: 97 F | DIASTOLIC BLOOD PRESSURE: 83 MMHG | BODY MASS INDEX: 37.3 KG/M2 | HEART RATE: 96 BPM

## 2023-12-07 DIAGNOSIS — H60.501 ACUTE OTITIS EXTERNA OF RIGHT EAR, UNSPECIFIED TYPE: Primary | ICD-10-CM

## 2023-12-07 RX ORDER — NEOMYCIN SULFATE, POLYMYXIN B SULFATE AND HYDROCORTISONE 10; 3.5; 1 MG/ML; MG/ML; [USP'U]/ML
3 SUSPENSION/ DROPS AURICULAR (OTIC) 4 TIMES DAILY
Qty: 10 ML | Refills: 0 | Status: SHIPPED | OUTPATIENT
Start: 2023-12-07

## 2024-02-21 ENCOUNTER — OFFICE VISIT (OUTPATIENT)
Dept: FAMILY MEDICINE CLINIC | Facility: CLINIC | Age: 44
End: 2024-02-21
Payer: COMMERCIAL

## 2024-02-21 VITALS
RESPIRATION RATE: 17 BRPM | WEIGHT: 220 LBS | SYSTOLIC BLOOD PRESSURE: 131 MMHG | HEART RATE: 94 BPM | DIASTOLIC BLOOD PRESSURE: 85 MMHG | BODY MASS INDEX: 43.19 KG/M2 | HEIGHT: 60 IN

## 2024-02-21 DIAGNOSIS — N39.46 MIXED STRESS AND URGE URINARY INCONTINENCE: ICD-10-CM

## 2024-02-21 DIAGNOSIS — R10.2 PELVIC PAIN: Primary | ICD-10-CM

## 2024-02-21 DIAGNOSIS — J01.10 SUBACUTE FRONTAL SINUSITIS: ICD-10-CM

## 2024-02-21 DIAGNOSIS — N64.4 MASTALGIA: ICD-10-CM

## 2024-02-21 LAB
CONTROL LINE PRESENT WITH A CLEAR BACKGROUND (YES/NO): YES YES/NO
KIT LOT #: NORMAL NUMERIC
PREGNANCY TEST, URINE: NEGATIVE

## 2024-02-21 PROCEDURE — 3008F BODY MASS INDEX DOCD: CPT | Performed by: FAMILY MEDICINE

## 2024-02-21 PROCEDURE — 3075F SYST BP GE 130 - 139MM HG: CPT | Performed by: FAMILY MEDICINE

## 2024-02-21 PROCEDURE — 3079F DIAST BP 80-89 MM HG: CPT | Performed by: FAMILY MEDICINE

## 2024-02-21 PROCEDURE — 81025 URINE PREGNANCY TEST: CPT | Performed by: FAMILY MEDICINE

## 2024-02-21 PROCEDURE — 99214 OFFICE O/P EST MOD 30 MIN: CPT | Performed by: FAMILY MEDICINE

## 2024-02-21 RX ORDER — FLUTICASONE PROPIONATE 50 MCG
2 SPRAY, SUSPENSION (ML) NASAL DAILY
Qty: 16 G | Refills: 0 | Status: SHIPPED | OUTPATIENT
Start: 2024-02-21 | End: 2024-03-02

## 2024-02-21 RX ORDER — ECHINACEA PURPUREA EXTRACT 125 MG
1 TABLET ORAL AS NEEDED
Qty: 60 ML | Refills: 0 | Status: SHIPPED | OUTPATIENT
Start: 2024-02-21

## 2024-02-21 RX ORDER — AMOXICILLIN 875 MG/1
875 TABLET, COATED ORAL 2 TIMES DAILY
Qty: 20 TABLET | Refills: 0 | Status: SHIPPED | OUTPATIENT
Start: 2024-02-21 | End: 2024-03-02

## 2024-02-21 NOTE — PROGRESS NOTES
Rosibel Mcguire is a 44 year old female.  HPI:   Patient reports that over the last few months she has been experiencing pelvic pain, symptoms appear to be worse during ovulation and just before menses and after manses, she also reports that when she is having bowel movements she has significant pelvic pain but this is also present only during menstruation.  Recently she also has been noticing urinary incontinence that is present with Valsalva maneuvers but also when her bladder is full.  Patient also reports mastalgia, no nipple discharge    Over the last 3 months she has been experiencing postnasal drip, nasal congestion, sinus pressure and headaches  Current Outpatient Medications   Medication Sig Dispense Refill    amoxicillin 875 MG Oral Tab Take 1 tablet (875 mg total) by mouth 2 (two) times daily for 10 days. 20 tablet 0    fluticasone propionate 50 MCG/ACT Nasal Suspension 2 sprays by Each Nare route daily for 10 days. 16 g 0    sodium chloride 0.65 % Nasal Solution 1 spray by Nasal route as needed. 60 mL 0    neomycin-polymyxin-hydrocortisone 3.5-01436-7 Otic Suspension Place 3 drops into the right ear 4 (four) times daily. 10 mL 0      No past medical history on file.   Social History:  Social History     Socioeconomic History    Marital status:    Tobacco Use    Smoking status: Never    Smokeless tobacco: Never   Vaping Use    Vaping Use: Never used   Substance and Sexual Activity    Alcohol use: Not Currently     Comment: social     Drug use: Never    Sexual activity: Yes     Partners: Male          EXAM:   /85   Pulse 94   Resp 17   Ht 5' (1.524 m)   Wt 220 lb (99.8 kg)   LMP 02/10/2024 (Exact Date)   BMI 42.97 kg/m²     Physical Exam  Vitals and nursing note reviewed.   Constitutional:       General: She is not in acute distress.  HENT:      Right Ear: Tympanic membrane, ear canal and external ear normal.      Left Ear: Tympanic membrane, ear canal and external ear normal.       Nose: Mucosal edema present.      Right Turbinates: Enlarged and swollen.      Right Sinus: Frontal sinus tenderness present.      Left Sinus: Frontal sinus tenderness present.   Cardiovascular:      Rate and Rhythm: Normal rate and regular rhythm.   Pulmonary:      Effort: Pulmonary effort is normal.   Abdominal:      General: Abdomen is flat. Bowel sounds are normal. There is no distension.      Tenderness: There is abdominal tenderness in the suprapubic area.   Neurological:      Mental Status: She is alert and oriented to person, place, and time.   Psychiatric:         Mood and Affect: Mood normal.         Behavior: Behavior normal.            ASSESSMENT AND PLAN:   1. Pelvic pain  Pregnancy test completed in clinic unremarkable, she has been presenting with pelvic pain that is worse during menses, not present at this time, noted also urinary incontinence, possible pelvic floor dysfunction, will obtain imaging, referral to urology provided, testing as noted  - US PELVIS (TRANSABDOMINAL AND TRANSVAGINAL) (CPT=76856/84046); Future  - POC Urine pregnancy test [78080]    2. Mastalgia    - Prolactin [E]; Future  - TSH W Reflex To Free T4 [E]; Future    3. Subacute frontal sinusitis    - amoxicillin 875 MG Oral Tab; Take 1 tablet (875 mg total) by mouth 2 (two) times daily for 10 days.  Dispense: 20 tablet; Refill: 0  - fluticasone propionate 50 MCG/ACT Nasal Suspension; 2 sprays by Each Nare route daily for 10 days.  Dispense: 16 g; Refill: 0  - sodium chloride 0.65 % Nasal Solution; 1 spray by Nasal route as needed.  Dispense: 60 mL; Refill: 0    4. Mixed stress and urge urinary incontinence    - Urology Referral - In Network  - Urinalysis with Culture Reflex; Future       The patient indicates understanding of these issues and agrees to the plan.      The above note was creating using Dragon speech recognition technology. Please excuse any typos.

## 2024-02-24 ENCOUNTER — LAB ENCOUNTER (OUTPATIENT)
Dept: LAB | Age: 44
End: 2024-02-24
Attending: FAMILY MEDICINE
Payer: COMMERCIAL

## 2024-02-24 DIAGNOSIS — N64.4 MASTALGIA: ICD-10-CM

## 2024-02-24 DIAGNOSIS — N39.46 MIXED STRESS AND URGE URINARY INCONTINENCE: ICD-10-CM

## 2024-02-24 LAB
BILIRUB UR QL: NEGATIVE
GLUCOSE UR-MCNC: NORMAL MG/DL
HGB UR QL STRIP.AUTO: NEGATIVE
KETONES UR-MCNC: NEGATIVE MG/DL
LEUKOCYTE ESTERASE UR QL STRIP.AUTO: 250
NITRITE UR QL STRIP.AUTO: NEGATIVE
PH UR: 6.5 [PH] (ref 5–8)
PROLACTIN SERPL-MCNC: 9.8 NG/ML
PROT UR-MCNC: NEGATIVE MG/DL
SP GR UR STRIP: 1.02 (ref 1–1.03)
TSI SER-ACNC: 4.72 MIU/ML (ref 0.55–4.78)
UROBILINOGEN UR STRIP-ACNC: NORMAL

## 2024-02-24 PROCEDURE — 87086 URINE CULTURE/COLONY COUNT: CPT

## 2024-02-24 PROCEDURE — 81001 URINALYSIS AUTO W/SCOPE: CPT

## 2024-02-24 PROCEDURE — 84443 ASSAY THYROID STIM HORMONE: CPT

## 2024-02-24 PROCEDURE — 84146 ASSAY OF PROLACTIN: CPT

## 2024-02-24 PROCEDURE — 36415 COLL VENOUS BLD VENIPUNCTURE: CPT

## 2024-03-18 ENCOUNTER — TELEPHONE (OUTPATIENT)
Dept: FAMILY MEDICINE CLINIC | Facility: CLINIC | Age: 44
End: 2024-03-18

## 2024-03-18 NOTE — TELEPHONE ENCOUNTER
Pt is requesting a 90 day refill on   Current Outpatient Medications:     sodium chloride 0.65 % Nasal Solution, 1 spray by Nasal route as needed., Disp: 60 mL, Rfl: 0

## 2024-04-16 ENCOUNTER — HOSPITAL ENCOUNTER (OUTPATIENT)
Dept: ULTRASOUND IMAGING | Age: 44
Discharge: HOME | End: 2024-04-16
Attending: FAMILY MEDICINE
Payer: COMMERCIAL

## 2024-04-16 DIAGNOSIS — R10.2 PELVIC PAIN: ICD-10-CM

## 2024-04-16 PROCEDURE — 76856 US EXAM PELVIC COMPLETE: CPT | Performed by: FAMILY MEDICINE

## 2024-04-16 PROCEDURE — 76830 TRANSVAGINAL US NON-OB: CPT | Performed by: FAMILY MEDICINE

## 2024-05-23 NOTE — ED NOTES
HISTORY OF PRESENT ILLNESS:      The patient is a 61 year old female with history of rheumatoid arthritis for several years now with cervicalgia with mainly pain over the left trapezius and occasional radiation to bilateral upper extremities, no numbness, nothing sensation, no sphincter compromises, Valsalva maneuvers do not elicit her pain.  Her visual analog pain score is up to 8/10.  She underwent physical therapy without improvement of her pain.  She is taking Orentzia and Plaquenil for her rheumatoid arthritis.        Imaging:  Last imaging reviewed.   MRI CERVICAL SPINE WO CONTRAST    (Results Pending)         Recent Imaging:  CT ABDOMEN PELVIS W CONTRAST - Oral and IV contrast  Narrative: EXAM: CT ABDOMEN PELVIS W CONTRAST     CLINICAL INDICATION: pain, vomiting     COMPARISON: CT abdomen pelvis with contrast 6/22/2022     TECHNIQUE: Helical CT was performed of the abdomen and pelvis, with axial,  coronal, and sagittal reconstructions performed and provided for review.  Omnipaque 350 was administered intravenously during the study. Automated  exposure control was utilized.     FINDINGS:     LOWER CHEST: The heart is normal in size. The lung bases are overall clear.     LIVER: Diffusely decreased hepatic attenuation compatible with hepatic  steatosis.     BILIARY TREE AND GALLBLADDER: Gallbladder has been removed. No biliary  dilatation.     PANCREAS: Normal.     SPLEEN: Normal.     ADRENAL GLANDS: Normal.     KIDNEYS AND URETERS: Normal.     BLADDER: Normal.     REPRODUCTIVE ORGANS: Normal uterus. No adnexal mass.     BOWEL: No bowel obstruction or acute inflammation. Appendix has been  removed.     PERITONEUM AND RETROPERITONEUM: No free air or fluid.     VESSELS: Normal caliber abdominal aorta with minimal atherosclerotic  calcification.     LYMPH NODES: No lymphadenopathy.     BODY WALL: Scattered foci of subcutaneous gas in the left buttock without  surrounding induration. Stable 4 mm nodule in the  Pt c/o intermittent upper abdominal pain that started last night. Describes at sharp, 8/10 at worst.  + nausea. Denies diarrhea or vomiting. outer central right  breast.     BONES: No acute or concerning findings.  Impression: Scattered foci of subcutaneous gas in the left buttock without surrounding  induration may be secondary to recent procedure or injection.     Hepatic steatosis.     Electronically Signed by: CHARITO ARCOS M.D.   Signed on: 11/7/2023 5:20 PM   Workstation ID: [id number]        Past Medical History:   History - past medical        Past Medical History:   Diagnosis Date    Chronic neck and back pain      Empty sella syndrome (CMD)      Fibromyalgia      JOLANTA (generalized anxiety disorder) 07/08/2019    GERD (gastroesophageal reflux disease)      HTN (hypertension)      ANGÉLICA (iron deficiency anemia)      Kidney stones      Osteoporosis      Postmenopausal bleeding 07/31/2019    Primary osteoarthritis of right knee 08/19/2016    Rheumatoid arthritis (CMD) 01/10/2013     Overview:  Auto-replaced for ICD-10 Conversion, run by Eliud Monson 10/1/2015 8:15p    Vitamin D deficiency              Past Surgical History:   History - past surgical         Past Surgical History:   Procedure Laterality Date    Appendectomy        Cholecystectomy        Total knee replacement                Medications:  Medications          Current Outpatient Medications   Medication Sig Dispense Refill    cyclobenzaprine (FLEXERIL) 10 MG tablet Take 1 tablet by mouth 3 times daily as needed for Muscle spasms. 90 tablet 0    ondansetron (ZOFRAN ODT) 4 MG disintegrating tablet Place 1 tablet onto the tongue every 8 hours as needed for Nausea. 12 tablet 0    dicyclomine (BENTYL) 20 MG tablet Take 1 tablet by mouth 4 times daily (before meals and nightly) for 7 days. As needed for abdominal cramping/discomfort. 28 tablet 0    abatacept (ORENCIA) 250 MG injection Inject 750 mg into the vein. Do not start before October 28, 2023.        Loratadine 10 MG Cap Take 10 mg by mouth. Do not start before October 28, 2023.        Acetaminophen 325 MG Cap Take 650 mg by mouth.  Do not start before October 28, 2023.        lisinopril (ZESTRIL) 10 MG tablet Take 1 tablet by mouth daily. 90 tablet 3    hydroxychloroquine (PLAQUENIL) 200 MG tablet TOME PIPE TABLETA TODOS LOS D AS        citalopram (CeleXA) 10 MG tablet Take 10 mg by mouth daily.        Vitamin D, Ergocalciferol, 1.25 mg (50,000 units) capsule TAKE 1 CAPSULE BY MOUTH 1 DAY A WEEK. 12 capsule 1    Ferrous Sulfate (IRON PO)          EPINEPHrine 0.3 MG/0.3ML auto-injector Inject 0.3 mLs into the muscle 1 time as needed for Anaphylaxis. 2 each 0    neomycin-polymyxin-hydroCORTisone (CORTISPORIN) 3.5-35530-1 otic solution Place 3 drops into left ear 4 times daily. 10 mL 0    fluticasone (FLONASE) 50 MCG/ACT nasal spray SPRAY 2 SPRAYS INTO EACH NOSTRIL EVERY DAY 16 mL 3      No current facility-administered medications for this encounter.         Current medications          Current Outpatient Medications   Medication Sig Dispense Refill    cyclobenzaprine (FLEXERIL) 10 MG tablet Take 1 tablet by mouth 3 times daily as needed for Muscle spasms. 90 tablet 0    ondansetron (ZOFRAN ODT) 4 MG disintegrating tablet Place 1 tablet onto the tongue every 8 hours as needed for Nausea. 12 tablet 0    dicyclomine (BENTYL) 20 MG tablet Take 1 tablet by mouth 4 times daily (before meals and nightly) for 7 days. As needed for abdominal cramping/discomfort. 28 tablet 0    abatacept (ORENCIA) 250 MG injection Inject 750 mg into the vein. Do not start before October 28, 2023.        Loratadine 10 MG Cap Take 10 mg by mouth. Do not start before October 28, 2023.        Acetaminophen 325 MG Cap Take 650 mg by mouth. Do not start before October 28, 2023.        lisinopril (ZESTRIL) 10 MG tablet Take 1 tablet by mouth daily. 90 tablet 3    hydroxychloroquine (PLAQUENIL) 200 MG tablet TOME PIPE TABLETA TODOS LOS D AS        citalopram (CeleXA) 10 MG tablet Take 10 mg by mouth daily.        Vitamin D, Ergocalciferol, 1.25 mg (50,000 units) capsule TAKE 1  CAPSULE BY MOUTH 1 DAY A WEEK. 12 capsule 1    Ferrous Sulfate (IRON PO)          EPINEPHrine 0.3 MG/0.3ML auto-injector Inject 0.3 mLs into the muscle 1 time as needed for Anaphylaxis. 2 each 0    neomycin-polymyxin-hydroCORTisone (CORTISPORIN) 3.5-04374-4 otic solution Place 3 drops into left ear 4 times daily. 10 mL 0    fluticasone (FLONASE) 50 MCG/ACT nasal spray SPRAY 2 SPRAYS INTO EACH NOSTRIL EVERY DAY 16 mL 3      No current facility-administered medications for this encounter.            Allergies:  ALLERGIES:  Amoxicillin-pot clavulanate, Bactrim ds, Celecoxib, Infliximab, and Morphine     Social History:   Social History            Tobacco Use    Smoking status: Never       Passive exposure: Never    Smokeless tobacco: Never   Vaping Use    Vaping Use: never used   Substance Use Topics    Alcohol use: No    Drug use: No         Family History:   History - family         Family History   Problem Relation Age of Onset    Diabetes Mother      Cancer, Prostate Father      Diabetes Maternal Aunt              REVIEW OF SYSTEMS:  Review of Systems  Constitutional: Negative for chills, diaphoresis and fever.  HENT: Negative.    Eyes: Negative.    Respiratory: Negative for chest tightness and shortness of breath.    Cardiovascular: Negative for chest pain.  Gastrointestinal: Negative for abdominal pain, nausea and vomiting.  Endocrine: Negative.    Genitourinary: Negative.    Musculoskeletal: Positive for arthralgias, back pain and myalgias.  Skin: Negative.    Allergic/Immunologic: Negative.    Neurological: Negative.    Hematological: Negative.    Psychiatric/Behavioral: Negative.          PHYSICAL EXAM:  This is an alert and  Obese patient in no acute distress at the moment of exam.     Vitals:    Blood pressure 127/72, pulse 94, temperature 97.9 °F (36.6 °C), temperature source Temporal, resp. rate 16, height 5' (1.524 m), weight 83.9 kg (185 lb), last menstrual period 11/09/2020, SpO2 100%.  Body mass index  is 36.13 kg/m².       HEENT: No scleral icterus, no nystagmus.   Cardiovascular: Normal peripheral perfusion.   Pulmonary/Chest: Non labored respirations, symmetrical chest expansion.   Skin: No rash noted.   Abdomen: Soft, no rebound tenderness.  Ext: No edema or erythema.     Neurologic: Cranial nerves II through XII are grossly intact.  Deep tendon reflexes are normal 2/4, strength of upper extremities is normal 5/5, strength of the lower extremities is normal at 5/5.  No signs of allodynia or trophic changes.     Musculoskeletal:     The cervical spine has  diminished range with pain, axial loading is negative.  There is no pain at palpation of the cervical spinous processes.  There is moderate pain to palpation of the cervical facet joints bilateral.  There is moderate pain to palpation of bilateral trapezius muscles.  There is no pain to palpation over bilateral occipital nerves.              IMPRESSION/RECOMMENDATIONS:      The patient is a 61 year old female with history of rheumatoid arthritis for several years now with cervicalgia with mainly pain over the left trapezius and occasional radiation to bilateral upper extremities, no numbness, nothing sensation, no sphincter compromises, Valsalva maneuvers do not elicit her pain.  Her visual analog pain score is up to 8/10.  She underwent physical therapy without improvement of her pain.  She is taking Orentzia and Plaquenil for her rheumatoid arthritis.  During the physical exam she has pain on palpation of the cervical spinous processes, cervical facet joints, bilateral trapezius.  The patient states that she cannot do an MRI because it is not approved by her insurance and also that she will not will be following in this pain clinic because of insurance coverage.  I will give her an order for physical therapy to be performed twice a week, for 6 weeks and she can follow-up with her next pain clinic.          Thank you very much.           PQRS :  The Patient  states  osteoarthritis over knees, hips and lumbar spine.  OA functioning assesed patient able to ambulate  without assisting devices .  Pain assesed and documented in the chart, visual analog pain score is  8 in  0-10 scale  Current medications in then chart.  Radiology exposure is documented in operative reports  Quality of life with primary headache disorder , the patient does not have a primary headache disorder.  Falls plan of care METS >4. Sitting test performed.  Risk assesment for falls completed.  Osteoporosis treatment is being assesed and treated by primary doctor.  Tobacco prevention performed patient is a   non-smoker.  Screening blood pressure is  appropriate for age. The patient will follow with  primary doctor.  BMI obese (BMI 30 - 34.9) .  The patient will follow with  primary doctor.  Advance care plan is family member/POA/friend.    History Obtained From: patient, electronic medical record.    Stanley Calderón MD  INTERVENTIONAL PAIN MANAGEMENT SPECIALIST  10:11 AM  05/23/24

## 2024-09-11 ENCOUNTER — APPOINTMENT (OUTPATIENT)
Dept: GENERAL RADIOLOGY | Age: 44
End: 2024-09-11
Attending: NURSE PRACTITIONER
Payer: COMMERCIAL

## 2024-09-11 ENCOUNTER — HOSPITAL ENCOUNTER (OUTPATIENT)
Age: 44
Discharge: HOME | End: 2024-09-11
Payer: COMMERCIAL

## 2024-09-11 VITALS
TEMPERATURE: 98 F | OXYGEN SATURATION: 97 % | SYSTOLIC BLOOD PRESSURE: 128 MMHG | RESPIRATION RATE: 18 BRPM | DIASTOLIC BLOOD PRESSURE: 79 MMHG | HEART RATE: 99 BPM

## 2024-09-11 DIAGNOSIS — S93.401S SPRAIN OF RIGHT ANKLE, UNSPECIFIED LIGAMENT, SEQUELA: ICD-10-CM

## 2024-09-11 DIAGNOSIS — M25.471 RIGHT ANKLE SWELLING: Primary | ICD-10-CM

## 2024-09-11 PROCEDURE — 73610 X-RAY EXAM OF ANKLE: CPT | Performed by: NURSE PRACTITIONER

## 2024-09-11 PROCEDURE — A6449 LT COMPRES BAND >=3" <5"/YD: HCPCS | Performed by: NURSE PRACTITIONER

## 2024-09-11 PROCEDURE — 99213 OFFICE O/P EST LOW 20 MIN: CPT | Performed by: NURSE PRACTITIONER

## 2024-09-11 NOTE — ED PROVIDER NOTES
Patient Seen in: [facility]      History     Chief Complaint   Patient presents with    Ankle Pain     Stated Complaint: R Ankle Pain    Subjective:   HPI    44 yr old female here for evaluation of right ankle pain and swelling x 2 days. She denies any fall, trauma, twisting. She has a small dog and may have tried to step around it and misstepped but no significant injury. Reports hx ankle sprain to bilateral ankles. 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 Denies calf or leg pain, knee pain, numbness or tingling to leg. Denies hx dvt/pe, blood thinner use, smoking, OCP use, recent surgery, immobilization, travel. PT also reports having a rash that has improved to right inner ankle. This started about 2 weeks ago, has been itchy. Denies drainage from area, fever or pain to area.        Objective:   History reviewed. No pertinent past medical history.           Past Surgical History:   Procedure Laterality Date    D & c                  Social History     Socioeconomic History    Marital status:    Tobacco Use    Smoking status: Never    Smokeless tobacco: Never   Vaping Use    Vaping status: Never Used   Substance and Sexual Activity    Alcohol use: Not Currently     Comment: social     Drug use: Never    Sexual activity: Yes     Partners: Male              Review of Systems    Positive for stated Chief Complaint: Ankle Pain    Other systems are as noted in HPI.  Constitutional and vital signs reviewed.      All other systems reviewed and negative except as noted above.    Physical Exam     ED Triage Vitals [09/11/24 1737]   /79   Pulse 99   Resp 18   Temp 97.7 °F (36.5 °C)   Temp src Temporal   SpO2 97 %   O2 Device None (Room air)       Current Vitals:   Vital Signs  BP: 128/79  Pulse: 99  Resp: 18  Temp: 97.7 °F (36.5 °C)  Temp src: Temporal    Oxygen Therapy  SpO2: 97 %  O2 Device: None (Room air)            Physical Exam  Vitals and nursing note reviewed.   Constitutional:        General: She is not in acute distress.     Appearance: Normal appearance. She is not ill-appearing, toxic-appearing or diaphoretic.   Cardiovascular:      Rate and Rhythm: Normal rate.      Pulses: Normal pulses.   Pulmonary:      Effort: Pulmonary effort is normal. No respiratory distress.   Musculoskeletal:      Right lower leg: No swelling, deformity, tenderness or bony tenderness. 1+ Edema present.      Left lower leg: No deformity, tenderness or bony tenderness. 1+ Edema present.      Right ankle: Swelling present. No deformity, ecchymosis or lacerations. Tenderness present over the lateral malleolus and medial malleolus. No base of 5th metatarsal or proximal fibula tenderness. Normal range of motion. Anterior drawer test negative. Normal pulse.      Right Achilles Tendon: Normal.      Right foot: Normal. Normal range of motion and normal capillary refill. No tenderness, bony tenderness or crepitus. Normal pulse.        Legs:       Comments: Circular area of maculopapular rash that is healing. No vesicles, no redness, induration.   Skin:     General: Skin is warm and dry.      Findings: No erythema.   Neurological:      Mental Status: She is alert and oriented to person, place, and time.      Motor: No weakness.   Psychiatric:         Mood and Affect: Mood normal.         Behavior: Behavior normal.             ED Course   Labs Reviewed - No data to display       ED Course as of 09/12/24 0903  ------------------------------------------------------------  Time: 09/11 1823  Value: XR ANKLE (MIN 3 VIEWS), RIGHT (CPT=73610)  Comment: Impression  CONCLUSION:  No fracture or dislocation.              MDM     44 yr old female here for right ankle pain and swelling x 2 days. Denies trauma or fall. HX ankle sprain to bl ankles in past.    ON exam, pt well appearing notable swelling to lateral mallelus of right ankle. Tender to lateral and medial malleolus. Full ROM to toes, pedal pulse normal. Skin warm and dry with no  bruising, redness, erythema. Painful ROM to ankle  When comparing right vs left, both lower legs/ankle appear to have equal swelling/edema, likely dependent edema besides lateral malleolus.  Nontender calf, knee.    Differential diagnoses reflecting the complexity of care include but are not limited to ankle sprain, ankle contusion, hx ankle sprain with now dependent edema from previous injury.    Comorbidities that add complexity to management include: none  History obtained by an independent source was from: patient  My independent interpretations of studies include: XR negative for fracture or dislocation  Shared decision making was done by: patient, myself  Discussions of management was done with: patient    Patient is well appearing, non-toxic and in no acute distress.  Vital signs are stable.     Discussed XR results. Advised on ace wrap for support of joint space this week then prn. Discussed compression stockings to help with swelling in general due to sedentary job. Discussed ibuprofen and ice to help with swelling and pain.  All questions answered. Return and ER precautions given.    Counseled: Patient, regarding diagnosis, regarding treatment plan, regarding diagnostic results, regarding prescription, I have discussed with the patient the results of tests, differential diagnosis, and warning signs and symptoms that should prompt immediate return. The patient understands these instructions and agrees to the follow-up plan provided. There is no barriers to learning. Appropriate f/u given. Patient agrees to return for any concerns/ problems/complications.                                 Medical Decision Making      Disposition and Plan     Clinical Impression:  1. Right ankle swelling    2. Sprain of right ankle, unspecified ligament, sequela         Disposition:  Discharge  9/11/2024  6:29 pm    Follow-up:  Laila Quintero, [facility]  [address],  [place] [address]  [phone]      As  needed          Medications Prescribed:  Discharge Medication List as of 9/11/2024  6:38 PM

## 2024-09-11 NOTE — DISCHARGE INSTRUCTIONS
Follow up with your primary care provider OR one given for continued evaluation if pain and swelling persists.    Elevate legs while at home resting.  Use compression stocking to help with general swelling and help with circulation while sitting for work.    Use ace wrap to right ankle to help with pain and swelling while this persists. This will help support your joint space.  Ibuprofen 600mg every 6 hours for pain and swelling.    RETURN OR GO TO ED for worsening pain/cramping to one leg, fever, redness or swelling up leg, chest pain, shortness of breath or dizziness.

## 2024-10-22 NOTE — PROGRESS NOTES
Subjective:   Rosibel Mcguire is a 44 year old female who presents for Nausea (Past 2 weeks, on & off, acid reflux has gotten worse, everything she eats or even before she eats gets acid reflux, feels pressure and bloated (Denied Influenza Vaccine) )   Patient is a pleasant 44-year-old female with no significant past medical history.  Patient presents to office today new to this provider former patient of Dr. Titus looking to establish care with Dr. Bridges.  Patient presents to office today for evaluation of stomach issues.  Patient states she has acid reflux. She is not sure if this is associated with her weight. She feels very bloated. Lots of pressure and acid reflux no matter what she eats. Trying to eat healthier and eating more broccoli and zucchini. She bends down and can fill brash. She also has a globus sensation. She has not been taking anything as of late. She was taking pepcid prior. She also has longstanding issues of abdominal pain with occasional diarrhea and constipation. Feels better after she goes to bathroom. Happens when she eats certain foods. Also having a lot more nasal congestion and post nasal drip. Has hx of allergies. Has been taking flonsae here and there with some relief.          History reviewed. No pertinent past medical history.   Past Surgical History:   Procedure Laterality Date    D & c          History/Other:    Chief Complaint Reviewed and Verified  Nursing Notes Reviewed and   Verified  Tobacco Reviewed  Allergies Reviewed  Medications Reviewed    Problem List Reviewed  Medical History Reviewed  Surgical History   Reviewed  OB Status Reviewed  Family History Reviewed  Social History   Reviewed         Tobacco:  She has never smoked tobacco.    Current Outpatient Medications   Medication Sig Dispense Refill    omeprazole 20 MG Oral Capsule Delayed Release Take 1 capsule (20 mg total) by mouth every morning before breakfast. 30 capsule 3     neomycin-polymyxin-hydrocortisone 3.5-97636-5 Otic Suspension Place 3 drops into the right ear 4 (four) times daily. 10 mL 0    sodium chloride 0.65 % Nasal Solution 1 spray by Nasal route as needed. (Patient not taking: Reported on 10/24/2024) 60 mL 3         Review of Systems:  Review of Systems   Constitutional: Negative.  Negative for activity change and appetite change.   HENT:  Positive for congestion, postnasal drip and rhinorrhea. Negative for sore throat, tinnitus and voice change.    Eyes: Negative.    Respiratory: Negative.  Negative for apnea, cough, chest tightness and shortness of breath.    Cardiovascular:  Negative for chest pain and leg swelling.   Gastrointestinal:  Positive for abdominal distention, abdominal pain, constipation and diarrhea. Negative for anal bleeding, blood in stool, nausea and vomiting.        Reflux   Genitourinary: Negative.  Negative for difficulty urinating, flank pain and menstrual problem.   Musculoskeletal: Negative.  Negative for joint swelling.   Skin: Negative.    Neurological: Negative.  Negative for dizziness and headaches.   Psychiatric/Behavioral: Negative.  Negative for agitation.          Objective:   BP 93/69 (BP Location: Right arm, Patient Position: Sitting, Cuff Size: large)   Pulse 102   Ht 5' (1.524 m)   Wt 234 lb 3.2 oz (106.2 kg)   LMP 10/23/2024 (Approximate)   SpO2 95%   BMI 45.74 kg/m²  Estimated body mass index is 45.74 kg/m² as calculated from the following:    Height as of this encounter: 5' (1.524 m).    Weight as of this encounter: 234 lb 3.2 oz (106.2 kg).  Physical Exam  Vitals and nursing note reviewed.   Constitutional:       Appearance: Normal appearance. She is obese.   HENT:      Head: Normocephalic and atraumatic.      Right Ear: Tympanic membrane, ear canal and external ear normal. There is no impacted cerumen.      Left Ear: Tympanic membrane, ear canal and external ear normal. There is no impacted cerumen.      Nose: Congestion  and rhinorrhea present.      Comments: Pale and boggy      Mouth/Throat:      Mouth: Mucous membranes are moist.      Pharynx: Oropharynx is clear.      Comments: Large PND  Cardiovascular:      Rate and Rhythm: Normal rate and regular rhythm.      Pulses: Normal pulses.      Heart sounds: Normal heart sounds. No murmur heard.  Pulmonary:      Effort: Pulmonary effort is normal. No respiratory distress.      Breath sounds: Normal breath sounds. No wheezing.   Abdominal:      General: Abdomen is flat. There is distension.      Palpations: Abdomen is soft. There is no mass.      Tenderness: There is abdominal tenderness.      Comments: Tender epigastric    Musculoskeletal:      Cervical back: Normal range of motion.   Skin:     General: Skin is warm and dry.      Capillary Refill: Capillary refill takes less than 2 seconds.   Neurological:      Mental Status: She is alert and oriented to person, place, and time.           Assessment & Plan:   1. Abdominal bloating (Primary)  -     Omeprazole; Take 1 capsule (20 mg total) by mouth every morning before breakfast.  Dispense: 30 capsule; Refill: 3  -     Helicobacter Pylori Breath Test; Future; Expected date: 10/24/2024  -     Gastro Referral - In Network  2. Gastroesophageal reflux disease, unspecified whether esophagitis present  -     Omeprazole; Take 1 capsule (20 mg total) by mouth every morning before breakfast.  Dispense: 30 capsule; Refill: 3  -     Helicobacter Pylori Breath Test; Future; Expected date: 10/24/2024  -     Gastro Referral - In Network  3. Irritable bowel syndrome with both constipation and diarrhea  4. Breast cancer screening by mammogram  -     [facility] ELIZABETH 2D+3D SCREENING BILAT (CPT=77067/61263); Future; Expected date: 10/24/2024  5. Obesity, unspecified class, unspecified obesity type, unspecified whether serious comorbidity present  6. Allergic rhinitis, unspecified seasonality, unspecified trigger    1. Abdominal bloating  Increased leafy green  vegetables  Provided with list of fodmaps  H pylori screen since off all meds  Start omeprazole following q am  GI referral as needed  - omeprazole 20 MG Oral Capsule Delayed Release; Take 1 capsule (20 mg total) by mouth every morning before breakfast.  Dispense: 30 capsule; Refill: 3  - Helicobacter Pylori Breath Test, Adult; Future  - Gastro Referral - In Network    2. Gastroesophageal reflux disease, unspecified whether esophagitis present  Reflux, brash, bloating, and burning with eating and bending  Check h pylori since off all meds  Start omeprazole following  Provided with education and tips  GI referral as needed  - omeprazole 20 MG Oral Capsule Delayed Release; Take 1 capsule (20 mg total) by mouth every morning before breakfast.  Dispense: 30 capsule; Refill: 3  - Helicobacter Pylori Breath Test, Adult; Future  - Gastro Referral - In Network    3. Irritable bowel syndrome with both constipation and diarrhea  Abd pain with diarrhea and constipation based on food eaten  Relieved after defecation  Educated on IBS  Provided with list of fodmaps  Increase fiber and water    4. Breast cancer screening by mammogram  Referral placed in system today  - [facility] ELIZABETH 2D+3D SCREENING BILAT (CPT=77067/88209); Future    5. Obesity, unspecified class, unspecified obesity type, unspecified whether serious comorbidity present  BMI in office today 45.74  Educated on diet and exercise  Will discuss further at physical    6. Allergic rhinitis, unspecified seasonality, unspecified trigger  Patient with exam consistent for allergic rhinitis  Pale boggy turbinates, postnasal drip with cobblestoning, and congestion in back of throat.  Patient educated on allergic rhinitis.  Educated on the use of normal saline rinses to flush out allergens.  Start antihistamine once daily.  Continue Flonase once daily bilateral nares.  If symptoms persist will consider steroid burst, addition of Singulair, or referral to allergy specialist.  Red  flags reviewed  Follow-up as needed    Patient aware of plan of care. All questions answered to satisfaction of the patient. Patient instructed to call office or reach out via [facility] if any issues arise. For urgent issues and/or reviewed red flags please proceed to the urgent care or ER.  Also, inform the nurse practitioner with any new symptoms or medication side effects.        Return for Annual physical.    LIBERTAD Pan, 10/22/2024, 7:58 AM    no

## 2024-10-24 ENCOUNTER — LAB ENCOUNTER (OUTPATIENT)
Dept: LAB | Age: 44
End: 2024-10-24
Payer: COMMERCIAL

## 2024-10-24 ENCOUNTER — OFFICE VISIT (OUTPATIENT)
Dept: FAMILY MEDICINE CLINIC | Facility: CLINIC | Age: 44
End: 2024-10-24
Payer: COMMERCIAL

## 2024-10-24 VITALS
WEIGHT: 234.19 LBS | DIASTOLIC BLOOD PRESSURE: 69 MMHG | BODY MASS INDEX: 45.98 KG/M2 | OXYGEN SATURATION: 95 % | HEART RATE: 102 BPM | HEIGHT: 60 IN | SYSTOLIC BLOOD PRESSURE: 93 MMHG

## 2024-10-24 DIAGNOSIS — Z12.31 BREAST CANCER SCREENING BY MAMMOGRAM: ICD-10-CM

## 2024-10-24 DIAGNOSIS — R14.0 ABDOMINAL BLOATING: ICD-10-CM

## 2024-10-24 DIAGNOSIS — E66.9 OBESITY, UNSPECIFIED CLASS, UNSPECIFIED OBESITY TYPE, UNSPECIFIED WHETHER SERIOUS COMORBIDITY PRESENT: ICD-10-CM

## 2024-10-24 DIAGNOSIS — K21.9 GASTROESOPHAGEAL REFLUX DISEASE, UNSPECIFIED WHETHER ESOPHAGITIS PRESENT: ICD-10-CM

## 2024-10-24 DIAGNOSIS — R14.0 ABDOMINAL BLOATING: Primary | ICD-10-CM

## 2024-10-24 DIAGNOSIS — J30.9 ALLERGIC RHINITIS, UNSPECIFIED SEASONALITY, UNSPECIFIED TRIGGER: ICD-10-CM

## 2024-10-24 DIAGNOSIS — K58.2 IRRITABLE BOWEL SYNDROME WITH BOTH CONSTIPATION AND DIARRHEA: ICD-10-CM

## 2024-10-24 PROCEDURE — 3008F BODY MASS INDEX DOCD: CPT

## 2024-10-24 PROCEDURE — 3078F DIAST BP <80 MM HG: CPT

## 2024-10-24 PROCEDURE — 99214 OFFICE O/P EST MOD 30 MIN: CPT

## 2024-10-24 PROCEDURE — 3074F SYST BP LT 130 MM HG: CPT

## 2024-10-24 PROCEDURE — 83013 H PYLORI (C-13) BREATH: CPT

## 2024-10-24 RX ORDER — LEVOCETIRIZINE DIHYDROCHLORIDE 5 MG/1
5 TABLET, FILM COATED ORAL EVERY EVENING
Qty: 30 TABLET | Refills: 0 | Status: SHIPPED | OUTPATIENT
Start: 2024-10-24

## 2024-10-25 LAB — H PYLORI BREATH TEST: NEGATIVE

## 2024-11-18 DIAGNOSIS — J30.9 ALLERGIC RHINITIS, UNSPECIFIED SEASONALITY, UNSPECIFIED TRIGGER: ICD-10-CM

## 2024-11-18 NOTE — TELEPHONE ENCOUNTER
Pharmacy requesting refill     levocetirizine 5 MG Oral Tab Take 1 tablet (5 mg total) by mouth every evening. 30 tablet 0

## 2024-11-22 RX ORDER — LEVOCETIRIZINE DIHYDROCHLORIDE 5 MG/1
5 TABLET, FILM COATED ORAL EVERY EVENING
Qty: 90 TABLET | Refills: 3 | Status: SHIPPED | OUTPATIENT
Start: 2024-11-22

## 2024-11-22 NOTE — TELEPHONE ENCOUNTER
LIBERTAD Dyson, please kindly review.    Levocetirizine 5 mg started at last office visit 10/24/24 for allergic rhinitis. Written for #30 with 0 additional refills.    Please advise if patient should continue and if refill is appropriate.    Patient has an appointment to establish care with Dr. Bridges for her annual physical exam.    Future Appointments   Date Time Provider Department Center   12/20/2024  8:00 AM Becky Bridges MD [facility]     Last office visit: 10/24/2024    Requested Prescriptions   Pending Prescriptions Disp Refills    levocetirizine 5 MG Oral Tab 90 tablet 0     Sig: Take 1 tablet (5 mg total) by mouth every evening.       Allergy Medication Protocol Passed - 11/22/2024  9:52 AM        Passed - In person appointment or virtual visit in the past 12 mos or appointment in next 3 mos     Recent Outpatient Visits              4 weeks ago Abdominal bloating    [facility] Kiel Leblanc APRN    Office Visit    9 months ago Pelvic pain    [facility], [address], [place] Rosibel Blancas MD    Office Visit    1 year ago Episodic lightheadedness    [facility], [address], [place] Rosibel Blancas MD    Office Visit    2 years ago Benign paroxysmal positional vertigo due to bilateral vestibular disorder    [facility], [address], [place] Rosibel Blancas MD    Office Visit    2 years ago Globus sensation    [facility] Jose Camarena MD    Office Visit          Future Appointments         Provider Department Appt Notes    In 2 weeks CLOTILDE CALZADA RM1; CLOTILDE SCOTT RM1 [facility]     In 4 weeks Becky Bridges MD [facility] NP physical - last was 8-29-22  transf from Dr Titus                         Future Appointments         Provider Department Appt Notes    In 2  weeks ADO DEXA RM1; ADO SCOTT RM1 [facility] [place]     In 4 weeks Becky Bridges MD [facility] NP physical - last was 8-29-22  transf from Dr Titus          Recent Outpatient Visits              4 weeks ago Abdominal bloating    [facility] Kiel Leblanc APRN    Office Visit    9 months ago Pelvic pain    [facility], [address], [place] Rosibel Blancas MD    Office Visit    1 year ago Episodic lightheadedness    [facility], [address], [place] Rosibel Blancas MD    Office Visit    2 years ago Benign paroxysmal positional vertigo due to bilateral vestibular disorder    [facility], [address], [place] Rosibel Blancas MD    Office Visit    2 years ago Globus sensation    [facility] [place], [facility], [place] Jose Camarena MD    Office Visit

## 2024-12-12 ENCOUNTER — HOSPITAL ENCOUNTER (OUTPATIENT)
Dept: MAMMOGRAPHY | Age: 44
Discharge: HOME | End: 2024-12-12
Payer: COMMERCIAL

## 2024-12-12 DIAGNOSIS — Z12.31 BREAST CANCER SCREENING BY MAMMOGRAM: ICD-10-CM

## 2024-12-12 PROCEDURE — 77063 BREAST TOMOSYNTHESIS BI: CPT

## 2024-12-12 PROCEDURE — 77067 SCR MAMMO BI INCL CAD: CPT

## 2024-12-20 ENCOUNTER — OFFICE VISIT (OUTPATIENT)
Dept: FAMILY MEDICINE CLINIC | Facility: CLINIC | Age: 44
End: 2024-12-20
Payer: COMMERCIAL

## 2024-12-20 ENCOUNTER — LAB ENCOUNTER (OUTPATIENT)
Dept: LAB | Age: 44
End: 2024-12-20
Attending: FAMILY MEDICINE
Payer: COMMERCIAL

## 2024-12-20 VITALS
WEIGHT: 236 LBS | BODY MASS INDEX: 46 KG/M2 | DIASTOLIC BLOOD PRESSURE: 83 MMHG | HEART RATE: 106 BPM | SYSTOLIC BLOOD PRESSURE: 132 MMHG

## 2024-12-20 DIAGNOSIS — Z00.00 ENCOUNTER FOR ANNUAL HEALTH EXAMINATION: Primary | ICD-10-CM

## 2024-12-20 DIAGNOSIS — Z00.00 ENCOUNTER FOR ANNUAL HEALTH EXAMINATION: ICD-10-CM

## 2024-12-20 LAB
ALBUMIN SERPL-MCNC: 4.4 G/DL (ref 3.2–4.8)
ALBUMIN/GLOB SERPL: 1.7 {RATIO} (ref 1–2)
ALP LIVER SERPL-CCNC: 72 U/L
ALT SERPL-CCNC: 29 U/L
ANION GAP SERPL CALC-SCNC: 8 MMOL/L (ref 0–18)
AST SERPL-CCNC: 20 U/L (ref ?–34)
BASOPHILS # BLD AUTO: 0.03 X10(3) UL (ref 0–0.2)
BASOPHILS NFR BLD AUTO: 0.4 %
BILIRUB SERPL-MCNC: 0.9 MG/DL (ref 0.3–1.2)
BUN BLD-MCNC: 9 MG/DL (ref 9–23)
BUN/CREAT SERPL: 11 (ref 10–20)
CALCIUM BLD-MCNC: 9.1 MG/DL (ref 8.7–10.4)
CHLORIDE SERPL-SCNC: 110 MMOL/L (ref 98–112)
CHOLEST SERPL-MCNC: 188 MG/DL (ref ?–200)
CO2 SERPL-SCNC: 22 MMOL/L (ref 21–32)
CREAT BLD-MCNC: 0.82 MG/DL
DEPRECATED RDW RBC AUTO: 44.4 FL (ref 35.1–46.3)
EGFRCR SERPLBLD CKD-EPI 2021: 91 ML/MIN/1.73M2 (ref 60–?)
EOSINOPHIL # BLD AUTO: 0.09 X10(3) UL (ref 0–0.7)
EOSINOPHIL NFR BLD AUTO: 1.2 %
ERYTHROCYTE [DISTWIDTH] IN BLOOD BY AUTOMATED COUNT: 13 % (ref 11–15)
EST. AVERAGE GLUCOSE BLD GHB EST-MCNC: 111 MG/DL (ref 68–126)
FASTING PATIENT LIPID ANSWER: YES
FASTING STATUS PATIENT QL REPORTED: YES
GLOBULIN PLAS-MCNC: 2.6 G/DL (ref 2–3.5)
GLUCOSE BLD-MCNC: 102 MG/DL (ref 70–99)
HBA1C MFR BLD: 5.5 % (ref ?–5.7)
HCT VFR BLD AUTO: 39.6 %
HDLC SERPL-MCNC: 50 MG/DL (ref 40–59)
HGB BLD-MCNC: 13.6 G/DL
IMM GRANULOCYTES # BLD AUTO: 0.02 X10(3) UL (ref 0–1)
IMM GRANULOCYTES NFR BLD: 0.3 %
LDLC SERPL CALC-MCNC: 117 MG/DL (ref ?–100)
LYMPHOCYTES # BLD AUTO: 1.47 X10(3) UL (ref 1–4)
LYMPHOCYTES NFR BLD AUTO: 20.4 %
MCH RBC QN AUTO: 32 PG (ref 26–34)
MCHC RBC AUTO-ENTMCNC: 34.3 G/DL (ref 31–37)
MCV RBC AUTO: 93.2 FL
MONOCYTES # BLD AUTO: 0.56 X10(3) UL (ref 0.1–1)
MONOCYTES NFR BLD AUTO: 7.8 %
NEUTROPHILS # BLD AUTO: 5.05 X10 (3) UL (ref 1.5–7.7)
NEUTROPHILS # BLD AUTO: 5.05 X10(3) UL (ref 1.5–7.7)
NEUTROPHILS NFR BLD AUTO: 69.9 %
NONHDLC SERPL-MCNC: 138 MG/DL (ref ?–130)
OSMOLALITY SERPL CALC.SUM OF ELEC: 289 MOSM/KG (ref 275–295)
PLATELET # BLD AUTO: 273 10(3)UL (ref 150–450)
POTASSIUM SERPL-SCNC: 4.1 MMOL/L (ref 3.5–5.1)
PROT SERPL-MCNC: 7 G/DL (ref 5.7–8.2)
RBC # BLD AUTO: 4.25 X10(6)UL
SODIUM SERPL-SCNC: 140 MMOL/L (ref 136–145)
T4 FREE SERPL-MCNC: 1.1 NG/DL (ref 0.8–1.7)
TRIGL SERPL-MCNC: 120 MG/DL (ref 30–149)
TSI SER-ACNC: 5.62 UIU/ML (ref 0.55–4.78)
VLDLC SERPL CALC-MCNC: 21 MG/DL (ref 0–30)
WBC # BLD AUTO: 7.2 X10(3) UL (ref 4–11)

## 2024-12-20 PROCEDURE — 36415 COLL VENOUS BLD VENIPUNCTURE: CPT

## 2024-12-20 PROCEDURE — 85025 COMPLETE CBC W/AUTO DIFF WBC: CPT

## 2024-12-20 PROCEDURE — 80053 COMPREHEN METABOLIC PANEL: CPT

## 2024-12-20 PROCEDURE — 83036 HEMOGLOBIN GLYCOSYLATED A1C: CPT

## 2024-12-20 PROCEDURE — 80061 LIPID PANEL: CPT

## 2024-12-20 PROCEDURE — 84439 ASSAY OF FREE THYROXINE: CPT

## 2024-12-20 PROCEDURE — 84443 ASSAY THYROID STIM HORMONE: CPT

## 2024-12-20 NOTE — PROGRESS NOTES
HPI:   Rosibel Mcguire is a 44 year old female who presents for a complete physical exam.        Wt Readings from Last 3 Encounters:   12/20/24 236 lb (107 kg)   10/24/24 234 lb 3.2 oz (106.2 kg)   02/21/24 220 lb (99.8 kg)     Body mass index is 46.09 kg/m².       Current Outpatient Medications   Medication Sig Dispense Refill    levocetirizine 5 MG Oral Tab Take 1 tablet (5 mg total) by mouth every evening. 90 tablet 3    omeprazole 20 MG Oral Capsule Delayed Release Take 1 capsule (20 mg total) by mouth every morning before breakfast. 30 capsule 3    sodium chloride 0.65 % Nasal Solution 1 spray by Nasal route as needed. (Patient not taking: Reported on 12/20/2024) 60 mL 3    neomycin-polymyxin-hydrocortisone 3.5-25147-9 Otic Suspension Place 3 drops into the right ear 4 (four) times daily. (Patient not taking: Reported on 12/20/2024) 10 mL 0      History reviewed. No pertinent past medical history.   Past Surgical History:   Procedure Laterality Date    D & c        Family History   Problem Relation Age of Onset    Hypertension Mother     Thyroid disease Mother     Lipids Mother     Hypertension Father     Lipids Father     Other (prediabetes) Father     Breast Cancer Maternal Aunt         30s      Social History:   Social History     Socioeconomic History    Marital status:    Tobacco Use    Smoking status: Never     Passive exposure: Never    Smokeless tobacco: Never   Vaping Use    Vaping status: Never Used   Substance and Sexual Activity    Alcohol use: Not Currently     Comment: social     Drug use: Never    Sexual activity: Yes     Partners: Male          REVIEW OF SYSTEMS:   Negative, except per HPI.     EXAM:   /83 (BP Location: Right arm, Patient Position: Sitting, Cuff Size: large)   Pulse 106   Wt 236 lb (107 kg)   LMP 11/25/2024 (Approximate)   BMI 46.09 kg/m²     GENERAL: well developed, well nourished, in no apparent distress  SKIN: no rashes, no suspicious lesions  HEENT:  atraumatic, normocephalic, ears are clear  EYES: PERRLA, EOMI,conjunctiva are clear  NECK: supple, no adenopathy  LUNGS: clear to auscultation  CARDIO: RRR without murmur  GI: good BS, no masses or tenderness  MUSCULOSKELETAL: back is not tender, FROM of the back  EXTREMITIES: no cyanosis or edema  NEURO: Oriented times three, cranial nerves are intact, motor and sensory are grossly intact    ASSESSMENT AND PLAN:   Rosibel Mcguire is a 44 year old female who presents for a complete physical exam.    1. Encounter for annual health examination  -Medical, surgical and social history, as well as medications and allergies were reviewed with patient.    - CBC With Differential With Platelet; Future  - Comp Metabolic Panel (14); Future  - Hemoglobin A1C; Future  - TSH W Reflex To Free T4; Future  - Lipid Panel; Future       Becky Bridges MD  12/20/2024  8:44 AM

## 2024-12-26 ENCOUNTER — TELEPHONE (OUTPATIENT)
Dept: FAMILY MEDICINE CLINIC | Facility: CLINIC | Age: 44
End: 2024-12-26

## 2024-12-26 NOTE — TELEPHONE ENCOUNTER
Prior auth question for semaglutide: Has the patient participated in a comprehensive weight management program that encourages behavioral modification, reduced-calorie diet, AND increased physical activity with continuing follow-up for at least 6 months prior to using drug therapy?     Please advise.

## 2024-12-26 NOTE — TELEPHONE ENCOUNTER
Approved    Prior authorization approved  [facility]ayer[facility] Case ID: [id number]    [phone]    [phone]  Note from payer: Your PA request has been approved.  Additional information will be provided in the approval communication. (Message 1149)  Approval Details    Authorized from December 26, 2024 to July 24, 2025  Electronic appeal: Not supported  View History

## 2025-01-09 ENCOUNTER — PATIENT MESSAGE (OUTPATIENT)
Dept: FAMILY MEDICINE CLINIC | Facility: CLINIC | Age: 45
End: 2025-01-09

## 2025-01-20 DIAGNOSIS — R14.0 ABDOMINAL BLOATING: ICD-10-CM

## 2025-01-20 DIAGNOSIS — K21.9 GASTROESOPHAGEAL REFLUX DISEASE, UNSPECIFIED WHETHER ESOPHAGITIS PRESENT: ICD-10-CM

## 2025-02-16 ENCOUNTER — HOSPITAL ENCOUNTER (OUTPATIENT)
Age: 45
Discharge: HOME | End: 2025-02-16
Payer: COMMERCIAL

## 2025-02-16 VITALS
TEMPERATURE: 98 F | DIASTOLIC BLOOD PRESSURE: 77 MMHG | SYSTOLIC BLOOD PRESSURE: 141 MMHG | OXYGEN SATURATION: 97 % | HEART RATE: 96 BPM | RESPIRATION RATE: 18 BRPM

## 2025-02-16 DIAGNOSIS — H60.391 OTHER INFECTIVE ACUTE OTITIS EXTERNA OF RIGHT EAR: Primary | ICD-10-CM

## 2025-02-16 RX ORDER — NEOMYCIN SULFATE, POLYMYXIN B SULFATE AND HYDROCORTISONE 10; 3.5; 1 MG/ML; MG/ML; [USP'U]/ML
3 SUSPENSION/ DROPS AURICULAR (OTIC) 4 TIMES DAILY
Qty: 10 ML | Refills: 0 | Status: SHIPPED | OUTPATIENT
Start: 2025-02-16

## 2025-02-16 NOTE — ED INITIAL ASSESSMENT (HPI)
Bilateral ear itchiness x1 month, developed R ear drainage and swelling from ear 3 weeks ago. Now reports increased pain this AM.

## 2025-02-16 NOTE — ED PROVIDER NOTES
Patient Seen in: [facility]      History     Chief Complaint   Patient presents with    Ear Problem Pain     Stated Complaint: Ear Pain  Subjective:   Rosibel is a 45-year-old female presenting to the immediate care complaining of right ear pain.  Patient states that she has had chronic ear itching for the past month or so.  States that she scratches the ear frequently and is usually fine however the past couple of days she has had increased itching and some pain to the right ear.  States that she feels like her ear is swollen.  Denies any drainage or discharge.  Denies any cough, congestion, rhinorrhea or other URI complaints.  She has not had a fever.  She is otherwise feeling well.  Patient denies any other complaints or concerns.        Objective:   History reviewed. No pertinent past medical history.         Past Surgical History:   Procedure Laterality Date    D & c                Social History     Socioeconomic History    Marital status:    Tobacco Use    Smoking status: Never     Passive exposure: Never    Smokeless tobacco: Never   Vaping Use    Vaping status: Never Used   Substance and Sexual Activity    Alcohol use: Not Currently     Comment: social     Drug use: Never    Sexual activity: Yes     Partners: Male            Review of Systems    Positive for stated complaint: Ear Problem Pain    Other systems are as noted in HPI.  Constitutional and vital signs reviewed.      All other systems reviewed and negative except as noted above.    Physical Exam     ED Triage Vitals [02/16/25 1119]   /77   Pulse 96   Resp 18   Temp 98.4 °F (36.9 °C)   Temp src Oral   SpO2 97 %   O2 Device None (Room air)     Current:/77   Pulse 96   Temp 98.4 °F (36.9 °C) (Oral)   Resp 18   LMP  (Within Weeks)   SpO2 97%     Physical Exam  Vitals and nursing note reviewed.   Constitutional:       General: She is not in acute distress.     Appearance: Normal appearance. She is not ill-appearing,  toxic-appearing or diaphoretic.   HENT:      Head: Normocephalic.      Right Ear: Tympanic membrane, ear canal and external ear normal. Tenderness present. No mastoid tenderness.      Left Ear: Tympanic membrane, ear canal and external ear normal.      Ears:      Comments: Mild erythema and tenderness to right ear canal; mild tragus tenderness, no mastoid tenderness     Nose: Nose normal.      Mouth/Throat:      Mouth: Mucous membranes are moist.      Pharynx: Oropharynx is clear.   Eyes:      Conjunctiva/sclera: Conjunctivae normal.   Cardiovascular:      Rate and Rhythm: Normal rate and regular rhythm.      Pulses: Normal pulses.      Heart sounds: Normal heart sounds.   Pulmonary:      Effort: Pulmonary effort is normal. No tachypnea, bradypnea, accessory muscle usage, prolonged expiration, respiratory distress or retractions.      Breath sounds: Normal breath sounds and air entry. No stridor, decreased air movement or transmitted upper airway sounds. No decreased breath sounds, wheezing, rhonchi or rales.   Abdominal:      General: Abdomen is flat.   Musculoskeletal:         General: Normal range of motion.      Cervical back: Normal range of motion.   Skin:     General: Skin is warm.      Capillary Refill: Capillary refill takes less than 2 seconds.   Neurological:      General: No focal deficit present.      Mental Status: She is alert and oriented to person, place, and time.   Psychiatric:         Mood and Affect: Mood normal.         Behavior: Behavior normal.         Thought Content: Thought content normal.         Judgment: Judgment normal.         ED Course   Radiology:    No orders to display     Labs Reviewed - No data to display    MDM     Medical Decision Making  Differential diagnoses reflecting the complexity of care include but are not limited to otitis media, otitis externa, otalgia, less likely mastoiditis.    Comorbidities that add complexity to management include: None  History obtained by an  independent source was from: Patient  Patient is well appearing, non-toxic and in no acute distress.  Vital signs are stable.     History and physical exam are consistent with otitis externa.  Prescribed Cortisporin antibiotic drops for otitis externa.  Recommended using tylenol or motrin for pain as needed.  Recommended that if the patient develops any pain, redness or swelling around your ear you need to go to the ED. Recommended that if the patient develops a fever or any other worsening or concerning symptoms they should go to the ED.  Recommended that if symptoms do not improve with antibiotic drops the patient should follow up with the Ear Nose and throat specialist. Otherwise recommended follow up with primary care doctor.     ED precautions discussed.  Patient (guardian) advised to follow up with PCP in 2-3 days.  Patient (guardian) agrees with this plan of care.  Patient (guardian) verbalizes understanding of discharge instructions and plan of care.      Risk  OTC drugs.  Prescription drug management.        Disposition and Plan     Clinical Impression:  1. Other infective acute otitis externa of right ear         Disposition:  Discharge  2/16/2025 12:06 pm    Follow-up:  Marcial Leone MD  [address] [place] [id number]  [phone]                Medications Prescribed:  Discharge Medication List as of 2/16/2025 12:11 PM

## 2025-03-12 ENCOUNTER — OFFICE VISIT (OUTPATIENT)
Dept: OTOLARYNGOLOGY | Facility: CLINIC | Age: 45
End: 2025-03-12
Payer: COMMERCIAL

## 2025-03-12 DIAGNOSIS — H93.8X1 SENSATION OF FULLNESS IN RIGHT EAR: Primary | ICD-10-CM

## 2025-03-12 DIAGNOSIS — J34.2 NASAL SEPTAL DEVIATION: ICD-10-CM

## 2025-03-12 DIAGNOSIS — H69.90 EUSTACHIAN TUBE DISORDER, UNSPECIFIED LATERALITY: ICD-10-CM

## 2025-03-12 PROCEDURE — 92567 TYMPANOMETRY: CPT | Performed by: STUDENT IN AN ORGANIZED HEALTH CARE EDUCATION/TRAINING PROGRAM

## 2025-03-12 PROCEDURE — 92504 EAR MICROSCOPY EXAMINATION: CPT | Performed by: STUDENT IN AN ORGANIZED HEALTH CARE EDUCATION/TRAINING PROGRAM

## 2025-03-12 PROCEDURE — 99203 OFFICE O/P NEW LOW 30 MIN: CPT | Performed by: STUDENT IN AN ORGANIZED HEALTH CARE EDUCATION/TRAINING PROGRAM

## 2025-03-12 RX ORDER — METHYLPREDNISOLONE 4 MG/1
TABLET ORAL
Qty: 21 TABLET | Refills: 0 | Status: SHIPPED | OUTPATIENT
Start: 2025-03-12

## 2025-03-12 RX ORDER — PSEUDOEPHEDRINE HCL 120 MG/1
120 TABLET, FILM COATED, EXTENDED RELEASE ORAL EVERY 12 HOURS
Qty: 28 TABLET | Refills: 0 | Status: SHIPPED | OUTPATIENT
Start: 2025-03-12 | End: 2025-03-26

## 2025-03-12 RX ORDER — FLUTICASONE PROPIONATE 50 MCG
2 SPRAY, SUSPENSION (ML) NASAL 2 TIMES DAILY
Qty: 16 G | Refills: 3 | Status: SHIPPED | OUTPATIENT
Start: 2025-03-12

## 2025-03-12 NOTE — PROGRESS NOTES
Rosibel Mcguire is a 45 year old female.   Chief Complaint   Patient presents with    Ear Problem     Patient is here for right ear echoing reports headaches every day reports neck hurting. And pressure on the ears. X 2 weeks      HPI:   45-year-old presents with a muffled sensation of her right ear.  This has been going on for a couple weeks she was given otic drops which have not helped.  Also reports a congestion in her nose and a sensation of mucus in her throat    Current Outpatient Medications   Medication Sig Dispense Refill    methylPREDNISolone 4 MG Oral Tablet Therapy Pack Take by oral route. 21 tablet 0    fluticasone propionate 50 MCG/ACT Nasal Suspension 2 sprays by Nasal route in the morning and 2 sprays before bedtime. 16 g 3    pseudoephedrine  MG Oral Tablet 12 Hr Take 1 tablet (120 mg total) by mouth every 12 (twelve) hours for 14 days. 28 tablet 0    semaglutide-weight management 0.25 MG/0.5ML Subcutaneous Solution Auto-injector Inject 0.5 mL (0.25 mg total) into the skin once a week for 4 doses. 2 mL 0    neomycin-polymyxin-hydrocortisone 3.5-06233-4 Otic Suspension Place 3 drops into the right ear 4 (four) times daily. 10 mL 0    omeprazole 20 MG Oral Capsule Delayed Release Take 1 capsule (20 mg total) by mouth before breakfast. 90 capsule 3    levocetirizine 5 MG Oral Tab Take 1 tablet (5 mg total) by mouth every evening. 90 tablet 3    sodium chloride 0.65 % Nasal Solution 1 spray by Nasal route as needed. (Patient not taking: Reported on 10/24/2024) 60 mL 3      History reviewed. No pertinent past medical history.   Social History:  Social History     Socioeconomic History    Marital status:    Tobacco Use    Smoking status: Never     Passive exposure: Never    Smokeless tobacco: Never   Vaping Use    Vaping status: Never Used   Substance and Sexual Activity    Alcohol use: Not Currently     Comment: social     Drug use: Never    Sexual activity: Yes     Partners: Male       Past Surgical History:   Procedure Laterality Date    D & c           EXAM:   LMP 11/25/2024 (Approximate)     System Details   Skin Inspection - Normal.   Constitutional Overall appearance - Normal.   Head/Face Symmetric, TMJ tenderness not present    Eyes EOMI, PERRL   Right ear:  Canal clear, TM is little dull and possibly retracted, no RG   Left ear:  Canal clear, TM intact, no RG   Nose: Septum deviated to the left, inferior turbinates not enlarged, nasal valves without collapse    Oral cavity/Oropharynx: No lesions or masses on inspection or palpation, tonsils symmetric    Neck: Soft without LAD, thyroid not enlarged  Voice clear/ no stridor   Other:      SCOPES AND PROCEDURES:         AUDIOGRAM AND IMAGING:         IMPRESSION:   1. Sensation of fullness in right ear    2. Nasal septal deviation    3. Eustachian tube disorder, unspecified laterality  - Audiology Exam  - pseudoephedrine  MG Oral Tablet 12 Hr; Take 1 tablet (120 mg total) by mouth every 12 (twelve) hours for 14 days.  Dispense: 28 tablet; Refill: 0       Recommendations:  -Discussed the differential including eustachian tube dysfunction versus TMD versus a migraine of her inner ear  -She had a flat tympanogram today and slight retraction of the tympanic membrane possibly has an aspect of eustachian tube dysfunction.  Will trial on a Medrol Dosepak, Sudafed and Flonase  -If still having issues in the next 2 weeks should return we will obtain an audiogram and tympanogram at that time  -Discussed the possibility of bruxism or a vestibular migraine as well given her possible scalloping of her tongue and her brain fog and visual symptoms    The patient indicates understanding of these issues and agrees to the plan.      Marcial Leone MD  3/12/2025  4:11 PM

## 2025-03-21 ENCOUNTER — TELEMEDICINE (OUTPATIENT)
Dept: FAMILY MEDICINE CLINIC | Facility: CLINIC | Age: 45
End: 2025-03-21

## 2025-03-21 ENCOUNTER — TELEPHONE (OUTPATIENT)
Dept: OTOLARYNGOLOGY | Facility: CLINIC | Age: 45
End: 2025-03-21

## 2025-03-21 NOTE — TELEPHONE ENCOUNTER
Current Outpatient Medications:     fluticasone propionate 50 MCG/ACT Nasal Suspension, 2 sprays by Nasal route in the morning and 2 sprays before bedtime., Disp: 16 g, Rfl: 3

## 2025-03-21 NOTE — TELEPHONE ENCOUNTER
Medication refill request for Flonase, he should have 3 refills. Left a voicemail for the pharmacy.  Last office visit:  3/12/25

## 2025-03-21 NOTE — PROGRESS NOTES
Virtual Telephone Check-In    Rosibel Mcguire verbally consents to a Virtual/Telephone Check-In service on 03/21/25.    Patient understands and accepts financial responsibility for any deductible, co-insurance and/or co-pays associated with this service.    Duration of the service: 20 minutes  This telemedicine visit was conducted using live audio and video.    Summary of topics discussed:     Doing well with the Wegovy.  She initially lost 12 pounds and has since plateaued would like to increase dosing.  Minimal side effects.       Physical Exam:  General: alert, speaking in full sentences, no acute distress  Lungs: respirations sound unlabored, no audible wheezing with speaking.  Neurologic: alert, oriented x3    Assessment and plan:    1. BMI 45.0-49.9, adult (HCC)  [place].  Appropriate use discussed with patient.  - The requested drug will be used with a reduced calorie diet and increase physical activity for chronic weight management  - New medication   - Patient has participated in a comprehensive weight management program that encourages behavior modification, reduced calorie diet and increased physical activity with continuing follow-up for at least 6 months prior to using the drug therapy  - BMI is greater than 27  - BMI is:  45    - semaglutide-weight management 0.5 MG/0.5ML Subcutaneous Solution Auto-injector; Inject 0.5 mL (0.5 mg total) into the skin once a week for 4 doses.  Dispense: 2 mL; Refill: 3      Advised to call back clinic if no improvement in symptoms. Red flags discussed to go to DIANE Bridges MD

## 2025-04-15 ENCOUNTER — TELEPHONE (OUTPATIENT)
Dept: FAMILY MEDICINE CLINIC | Facility: CLINIC | Age: 45
End: 2025-04-15

## 2025-04-15 RX ORDER — SEMAGLUTIDE 0.5 MG/.5ML
0.5 INJECTION, SOLUTION SUBCUTANEOUS WEEKLY
COMMUNITY
Start: 2025-03-21

## 2025-04-15 NOTE — TELEPHONE ENCOUNTER
Called pharmacy and inquired if there were any other plan alternatives coming up on their end.    WEGOVY IS A PLAN EXCLUSION.    Patient does not have weight loss medication coverage options, UNLESS they are enrolled into some weight loss program through their pharmacy benefits plan (unsure if this is certain option for patient).     Pharmacist recommended the patient contact their insurance to figure out how to do that.    [employer] message sent to patient to inform her.

## 2025-07-18 ENCOUNTER — OFFICE VISIT (OUTPATIENT)
Dept: FAMILY MEDICINE CLINIC | Facility: CLINIC | Age: 45
End: 2025-07-18
Payer: COMMERCIAL

## 2025-07-18 VITALS
BODY MASS INDEX: 41 KG/M2 | HEART RATE: 114 BPM | SYSTOLIC BLOOD PRESSURE: 126 MMHG | DIASTOLIC BLOOD PRESSURE: 83 MMHG | WEIGHT: 210 LBS

## 2025-07-18 DIAGNOSIS — B37.2 CANDIDAL INTERTRIGO: ICD-10-CM

## 2025-07-18 DIAGNOSIS — N39.46 MIXED STRESS AND URGE URINARY INCONTINENCE: Primary | ICD-10-CM

## 2025-07-18 PROCEDURE — 3079F DIAST BP 80-89 MM HG: CPT | Performed by: FAMILY MEDICINE

## 2025-07-18 PROCEDURE — 99214 OFFICE O/P EST MOD 30 MIN: CPT | Performed by: FAMILY MEDICINE

## 2025-07-18 PROCEDURE — 3074F SYST BP LT 130 MM HG: CPT | Performed by: FAMILY MEDICINE

## 2025-07-18 RX ORDER — SEMAGLUTIDE 1.7 MG/.75ML
INJECTION, SOLUTION SUBCUTANEOUS
COMMUNITY
Start: 2025-06-09

## 2025-07-18 RX ORDER — NYSTATIN 100000 [USP'U]/G
1 POWDER TOPICAL 2 TIMES DAILY
Qty: 60 G | Refills: 0 | Status: SHIPPED | OUTPATIENT
Start: 2025-07-18

## 2025-07-18 RX ORDER — FLUCONAZOLE 200 MG/1
200 TABLET ORAL DAILY
Qty: 7 TABLET | Refills: 0 | Status: SHIPPED | OUTPATIENT
Start: 2025-07-18

## 2025-08-26 ENCOUNTER — OFFICE VISIT (OUTPATIENT)
Dept: UROLOGY | Facility: HOSPITAL | Age: 45
End: 2025-08-26
Attending: OBSTETRICS & GYNECOLOGY

## 2025-08-26 VITALS — RESPIRATION RATE: 18 BRPM | BODY MASS INDEX: 39.65 KG/M2 | WEIGHT: 210 LBS | HEIGHT: 61 IN

## 2025-08-26 DIAGNOSIS — N94.10 DYSPAREUNIA IN FEMALE: ICD-10-CM

## 2025-08-26 DIAGNOSIS — N92.0 MENORRHAGIA WITH REGULAR CYCLE: ICD-10-CM

## 2025-08-26 DIAGNOSIS — N39.3 STRESS INCONTINENCE: Primary | ICD-10-CM

## 2025-08-26 DIAGNOSIS — E66.9 OBESITY (BMI 30-39.9): ICD-10-CM

## 2025-08-26 DIAGNOSIS — R82.90 CLOUDY URINE: ICD-10-CM

## 2025-08-26 LAB
BLOOD URINE: NEGATIVE
CONTROL RUN WITHIN 24 HOURS?: YES
NITRITE URINE: NEGATIVE

## 2025-08-26 PROCEDURE — 51701 INSERT BLADDER CATHETER: CPT

## 2025-08-26 PROCEDURE — 99202 OFFICE O/P NEW SF 15 MIN: CPT

## 2025-08-26 PROCEDURE — 81002 URINALYSIS NONAUTO W/O SCOPE: CPT | Performed by: OBSTETRICS & GYNECOLOGY

## 2025-08-26 PROCEDURE — 87086 URINE CULTURE/COLONY COUNT: CPT | Performed by: OBSTETRICS & GYNECOLOGY

## (undated) NOTE — ED AVS SNAPSHOT
[facility] AND [facility] in [facility] [address] [facility] in [place]    Phone:  [phone]    Fax:  [phone]           Mrs. Sara Rutherford   MRN: [id number]    Department:  [facility] AND [facility] in [address] [place] [address]   Date of V Insurance plans vary and the physician(s) referred by the Immediate Care may not be covered by your plan. It is possible that the physician may not participate in your health insurance plan.   This may result in a lower benefit level being available to yo CARE PHYSICIAN AT ONCE OR GO TO THE EMERGENCY DEPARTMENT. If you have been prescribed any medication(s), please fill your prescription right away and begin taking the medication(s) as directed.   If you believe that any of the medications or instructions - If you have concerns related to behavioral health issues or thoughts of harming yourself, contact [address] [place] [address] at [phone].     - If you don’t have insurance, [facility]ha Sparks has partnered with [employer]

## (undated) NOTE — LETTER
[address]  [place] [address]  Dept: [phone]  Dept Fax: [phone]  Loc: [phone]      March 8, 2017    Patient: Abdullahi Gil   Date of Visit: 3/8/2017       To Whom It May Concern:    Caren Whitt